# Patient Record
Sex: MALE | Race: WHITE | Employment: OTHER | URBAN - METROPOLITAN AREA
[De-identification: names, ages, dates, MRNs, and addresses within clinical notes are randomized per-mention and may not be internally consistent; named-entity substitution may affect disease eponyms.]

---

## 2018-01-05 PROBLEM — E66.01 OBESITY, MORBID (HCC): Status: ACTIVE | Noted: 2018-01-05

## 2018-01-05 PROBLEM — I10 ESSENTIAL HYPERTENSION: Status: ACTIVE | Noted: 2018-01-05

## 2019-12-04 ENCOUNTER — HOSPITAL ENCOUNTER (EMERGENCY)
Age: 64
Discharge: HOME | End: 2019-12-04
Attending: EMERGENCY MEDICINE
Payer: COMMERCIAL

## 2019-12-04 VITALS
OXYGEN SATURATION: 94 % | DIASTOLIC BLOOD PRESSURE: 63 MMHG | WEIGHT: 315 LBS | SYSTOLIC BLOOD PRESSURE: 132 MMHG | TEMPERATURE: 99 F | HEART RATE: 66 BPM | RESPIRATION RATE: 16 BRPM | BODY MASS INDEX: 41.96 KG/M2

## 2019-12-04 DIAGNOSIS — N30.90 CYSTITIS: Primary | ICD-10-CM

## 2019-12-04 LAB
ALBUMIN SERPL-MCNC: 3.5 G/DL (ref 3.2–4.6)
ALBUMIN/GLOB SERPL: 0.7 {RATIO} (ref 1.2–3.5)
ALP SERPL-CCNC: 69 U/L (ref 50–136)
ALT SERPL-CCNC: 41 U/L (ref 12–65)
AMORPH CRY URNS QL MICRO: ABNORMAL
ANION GAP SERPL CALC-SCNC: 6 MMOL/L (ref 7–16)
AST SERPL-CCNC: 28 U/L (ref 15–37)
BACTERIA URNS QL MICRO: ABNORMAL /HPF
BASOPHILS # BLD: 0 K/UL (ref 0–0.2)
BASOPHILS NFR BLD: 0 % (ref 0–2)
BILIRUB SERPL-MCNC: 0.4 MG/DL (ref 0.2–1.1)
BUN SERPL-MCNC: 15 MG/DL (ref 8–23)
CALCIUM SERPL-MCNC: 8.7 MG/DL (ref 8.3–10.4)
CASTS URNS QL MICRO: 0 /LPF
CHLORIDE SERPL-SCNC: 104 MMOL/L (ref 98–107)
CO2 SERPL-SCNC: 28 MMOL/L (ref 21–32)
CREAT SERPL-MCNC: 1.21 MG/DL (ref 0.8–1.5)
CRYSTALS URNS QL MICRO: 0 /LPF
DIFFERENTIAL METHOD BLD: ABNORMAL
EOSINOPHIL # BLD: 0.2 K/UL (ref 0–0.8)
EOSINOPHIL NFR BLD: 2 % (ref 0.5–7.8)
EPI CELLS #/AREA URNS HPF: 0 /HPF
ERYTHROCYTE [DISTWIDTH] IN BLOOD BY AUTOMATED COUNT: 12.7 % (ref 11.9–14.6)
GLOBULIN SER CALC-MCNC: 4.7 G/DL (ref 2.3–3.5)
GLUCOSE SERPL-MCNC: 113 MG/DL (ref 65–100)
HCT VFR BLD AUTO: 42.9 % (ref 41.1–50.3)
HGB BLD-MCNC: 14.2 G/DL (ref 13.6–17.2)
IMM GRANULOCYTES # BLD AUTO: 0 K/UL (ref 0–0.5)
IMM GRANULOCYTES NFR BLD AUTO: 0 % (ref 0–5)
LYMPHOCYTES # BLD: 3.2 K/UL (ref 0.5–4.6)
LYMPHOCYTES NFR BLD: 31 % (ref 13–44)
MCH RBC QN AUTO: 30 PG (ref 26.1–32.9)
MCHC RBC AUTO-ENTMCNC: 33.1 G/DL (ref 31.4–35)
MCV RBC AUTO: 90.7 FL (ref 79.6–97.8)
MONOCYTES # BLD: 1.1 K/UL (ref 0.1–1.3)
MONOCYTES NFR BLD: 10 % (ref 4–12)
MUCOUS THREADS URNS QL MICRO: 0 /LPF
NEUTS SEG # BLD: 5.7 K/UL (ref 1.7–8.2)
NEUTS SEG NFR BLD: 56 % (ref 43–78)
NRBC # BLD: 0 K/UL (ref 0–0.2)
OTHER OBSERVATIONS,UCOM: ABNORMAL
PLATELET # BLD AUTO: 240 K/UL (ref 150–450)
PMV BLD AUTO: 9.1 FL (ref 9.4–12.3)
POTASSIUM SERPL-SCNC: 4 MMOL/L (ref 3.5–5.1)
PROT SERPL-MCNC: 8.2 G/DL (ref 6.3–8.2)
RBC # BLD AUTO: 4.73 M/UL (ref 4.23–5.6)
RBC #/AREA URNS HPF: >100 /HPF
SODIUM SERPL-SCNC: 138 MMOL/L (ref 136–145)
WBC # BLD AUTO: 10.2 K/UL (ref 4.3–11.1)
WBC URNS QL MICRO: >100 /HPF

## 2019-12-04 PROCEDURE — 96365 THER/PROPH/DIAG IV INF INIT: CPT | Performed by: EMERGENCY MEDICINE

## 2019-12-04 PROCEDURE — 74011250636 HC RX REV CODE- 250/636: Performed by: EMERGENCY MEDICINE

## 2019-12-04 PROCEDURE — 85025 COMPLETE CBC W/AUTO DIFF WBC: CPT

## 2019-12-04 PROCEDURE — 81003 URINALYSIS AUTO W/O SCOPE: CPT | Performed by: EMERGENCY MEDICINE

## 2019-12-04 PROCEDURE — 99284 EMERGENCY DEPT VISIT MOD MDM: CPT | Performed by: EMERGENCY MEDICINE

## 2019-12-04 PROCEDURE — 80053 COMPREHEN METABOLIC PANEL: CPT

## 2019-12-04 PROCEDURE — 81015 MICROSCOPIC EXAM OF URINE: CPT

## 2019-12-04 RX ORDER — CIPROFLOXACIN 500 MG/1
500 TABLET ORAL 2 TIMES DAILY
Qty: 20 TAB | Refills: 0 | Status: SHIPPED | OUTPATIENT
Start: 2019-12-04 | End: 2019-12-14

## 2019-12-04 RX ORDER — CIPROFLOXACIN 500 MG/1
500 TABLET ORAL 2 TIMES DAILY
Qty: 20 TAB | Refills: 0 | Status: SHIPPED | OUTPATIENT
Start: 2019-12-04 | End: 2019-12-04 | Stop reason: SDUPTHER

## 2019-12-04 RX ORDER — CIPROFLOXACIN 2 MG/ML
400 INJECTION, SOLUTION INTRAVENOUS
Status: COMPLETED | OUTPATIENT
Start: 2019-12-04 | End: 2019-12-04

## 2019-12-04 RX ADMIN — CIPROFLOXACIN 400 MG: 2 INJECTION, SOLUTION INTRAVENOUS at 21:38

## 2019-12-04 NOTE — ED TRIAGE NOTES
Pt to ed with c/o urinary problems, pt reports frequency and feeling like he needs to go but unable to get much out - pt reports that he was seen a few weeks ago at [address] with sepsis and uti - pt denies any n/v/d

## 2019-12-05 NOTE — ED PROVIDER NOTES
60-year-old male recently hospitalized for sepsis of urinary origin and rhabdomyolysis presenting for recurrent suprapubic discomfort, burning and hematuria. Reports that 3 weeks ago the patient started having very similar symptoms and was seen in urgent care where he was given an intramuscular shot of ceftriaxone. Was sent home with a prescription for oral antibiotics but that night he became delirious, fell between his bed and the nightstand and is unsure of exactly how long he was there. He was found by his brother and EMS was called. Was eventually seen at [facility] where he was found to have a CK of over 14,000, elevated white count, elevated heart rate and was admitted to the hospital for sepsis of urinary origin. He had a 6-day stay was treated with IV antibiotics. He was discharged home with 3 more days of Keflex and his symptoms have gotten better until today. He is describing burning, hesitancy, frequency, and scant hematuria. He denies fevers, nausea, vomiting. The history is provided by the patient. Urinary Pain    Associated symptoms include frequency, hematuria and urgency. Past Medical History:   Diagnosis Date    DVT of leg (deep venous thrombosis) ([facility])     Epistaxis     Essential hypertension 1/5/2018    GERD (gastroesophageal reflux disease)     Pulmonary embolus ([facility])        No past surgical history on file.       Family History:   Problem Relation Age of Onset    Stroke Mother     Heart Disease Mother     Heart Disease Brother        Social History     Socioeconomic History    Marital status:      Spouse name: Not on file    Number of children: Not on file    Years of education: Not on file    Highest education level: Not on file   Occupational History    Not on file   Social Needs    Financial resource strain: Not on file    Food insecurity:     Worry: Not on file     Inability: Not on file    Transportation needs:     Medical: Not on file Non-medical: Not on file   Tobacco Use    Smoking status: Former Smoker    Smokeless tobacco: Never Used    Tobacco comment: quit 15-20 years ago    Substance and Sexual Activity    Alcohol use: No    Drug use: No    Sexual activity: Not on file   Lifestyle    Physical activity:     Days per week: Not on file     Minutes per session: Not on file    Stress: Not on file   Relationships    Social connections:     Talks on phone: Not on file     Gets together: Not on file     Attends Buddhist service: Not on file     Active member of club or organization: Not on file     Attends meetings of clubs or organizations: Not on file     Relationship status: Not on file    Intimate partner violence:     Fear of current or ex partner: Not on file     Emotionally abused: Not on file     Physically abused: Not on file     Forced sexual activity: Not on file   Other Topics Concern    Not on file   Social History Narrative    Not on file         ALLERGIES: Aspirin and Ibuprofen    Review of Systems   Genitourinary: Positive for difficulty urinating, dysuria, frequency, hematuria and urgency. All other systems reviewed and are negative. Vitals:    12/04/19 1743   BP: 149/84   Pulse: 79   Resp: 20   Temp: 98.8 °F (37.1 °C)   SpO2: 98%   Weight: 144.2 kg (318 lb)            Physical Exam  Vitals signs and nursing note reviewed. Constitutional:       Appearance: Normal appearance. He is well-developed. HENT:      Head: Normocephalic and atraumatic. Nose: Nose normal.   Eyes:      Extraocular Movements: Extraocular movements intact. Conjunctiva/sclera: Conjunctivae normal.      Pupils: Pupils are equal, round, and reactive to light. Neck:      Musculoskeletal: Normal range of motion and neck supple. Cardiovascular:      Rate and Rhythm: Normal rate and regular rhythm. Heart sounds: Normal heart sounds.    Pulmonary:      Effort: Pulmonary effort is normal.      Breath sounds: Normal breath sounds. Abdominal:      General: Bowel sounds are normal.      Palpations: Abdomen is soft. Musculoskeletal: Normal range of motion. General: No deformity. Skin:     General: Skin is warm and dry. Neurological:      Mental Status: He is alert and oriented to person, place, and time. Cranial Nerves: No cranial nerve deficit. Psychiatric:         Behavior: Behavior normal.          MDM  Number of Diagnoses or Management Options  Cystitis:   Diagnosis management comments: 60-year-old male presenting for recurrent urinary symptoms. Appears he did have a complicated course several weeks ago with sepsis of urinary origin currently he appears clinically well. Blood work was ordered prior to my evaluation and is unremarkable. A review of the medical record reveals that during his initial evaluation several weeks ago the patient had an elevated white count of 22,000, a CPK of greater than 14,000, acute renal failure, tachycardia and a prolonged hospitalization. Today he has none of those things. Review of the medical record revealed pan susceptible E. coli some treating the patient with Cipro given that it has good prostate penetration. He has follow-up with urology in 2 days.        Amount and/or Complexity of Data Reviewed  Clinical lab tests: ordered and reviewed (Results for orders placed or performed during the hospital encounter of 12/04/19  -CBC WITH AUTOMATED DIFF       Result                      Value             Ref Range           WBC                         10.2              4.3 - 11.1 K*       RBC                         4.73              4.23 - 5.6 M*       HGB                         14.2              13.6 - 17.2 *       HCT                         42.9              41.1 - 50.3 %       MCV                         90.7              79.6 - 97.8 *       MCH                         30.0              26.1 - 32.9 *       MCHC                        33.1              31.4 - 35.0 * RDW                         12.7              11.9 - 14.6 %       PLATELET                    240               150 - 450 K/*       MPV                         9.1 (L)           9.4 - 12.3 FL       ABSOLUTE NRBC               0.00              0.0 - 0.2 K/*       DF                          AUTOMATED                             NEUTROPHILS                 56                43 - 78 %           LYMPHOCYTES                 31                13 - 44 %           MONOCYTES                   10                4.0 - 12.0 %        EOSINOPHILS                 2                 0.5 - 7.8 %         BASOPHILS                   0                 0.0 - 2.0 %         IMMATURE GRANULOCYTES       0                 0.0 - 5.0 %         ABS. NEUTROPHILS            5.7               1.7 - 8.2 K/*       ABS. LYMPHOCYTES            3.2               0.5 - 4.6 K/*       ABS. MONOCYTES              1.1               0.1 - 1.3 K/*       ABS. EOSINOPHILS            0.2               0.0 - 0.8 K/*       ABS. BASOPHILS              0.0               0.0 - 0.2 K/*       ABS. IMM.  GRANS.            0.0               0.0 - 0.5 K/*  -METABOLIC PANEL, COMPREHENSIVE       Result                      Value             Ref Range           Sodium                      138               136 - 145 mm*       Potassium                   4.0               3.5 - 5.1 mm*       Chloride                    104               98 - 107 mmo*       CO2                         28                21 - 32 mmol*       Anion gap                   6 (L)             7 - 16 mmol/L       Glucose                     113 (H)           65 - 100 mg/*       BUN                         15                8 - 23 MG/DL        Creatinine                  1.21              0.8 - 1.5 MG*       GFR est AA                  >60               >60 ml/min/1*       GFR est non-AA              >60               >60 ml/min/1*       Calcium                     8.7               8.3 - 10.4 M* Bilirubin, total            0.4               0.2 - 1.1 MG*       ALT (SGPT)                  41                12 - 65 U/L         AST (SGOT)                  28                15 - 37 U/L         Alk. phosphatase            69                50 - 136 U/L        Protein, total              8.2               6.3 - 8.2 g/*       Albumin                     3.5               3.2 - 4.6 g/*       Globulin                    4.7 (H)           2.3 - 3.5 g/*       A-G Ratio                   0.7 (L)           1.2 - 3.5      -URINE MICROSCOPIC       Result                      Value             Ref Range           WBC                         >100              0 /hpf              RBC                         >100              0 /hpf              Epithelial cells            0                 0 /hpf              Bacteria                    4+ (H)            0 /hpf              Casts                       0                 0 /lpf              Crystals, urine             0                 0 /LPF              Amorphous Crystals          1+ (H)            0                   Mucus                       0                 0 /lpf              Other observations                                            RESULTS VERIFIED MANUALLY  )  Decide to obtain previous medical records or to obtain history from someone other than the patient: yes  Review and summarize past medical records: yes (Recent hospitalization for sepsis of urinary origin. During that stay the patient had agreed distally abnormal labs including elevated white count, CPK, abnormal vital signs.)    Risk of Complications, Morbidity, and/or Mortality  Presenting problems: high  Diagnostic procedures: high  Management options: moderate  General comments: Appears hemodynamically stable. No signs of sepsis.   Patient does have 4+ bacteria and urinalysis but I think we can treat with IV Cipro here in the emergency department for good prostate penetration and the patient has follow-up with Dr. Daisy Almanza in 2 days. He is been given clear return precautions should his symptoms change    I personally reviewed the patient's vital signs, laboratory tests, and/or radiological findings. I discussed these findings with the patient and their significance. I answered all questions and gave the patient clear return precautions.   The patient was discharged from the emergency department in stable condition        Patient Progress  Patient progress: stable         Procedures

## 2019-12-05 NOTE — DISCHARGE INSTRUCTIONS
You have no signs of sepsis today. It does not mean that it will not change between now and Friday. Start your prescription antibiotics tomorrow morning. Return to the ER should she develop fevers, confusion, inability to urinate.

## 2019-12-05 NOTE — ED NOTES
I have reviewed discharge instructions with the patient. The patient verbalized understanding. Patient left ED via Discharge Method: ambulatory to Home with (insert name of family/friend, self, transport family). Opportunity for questions and clarification provided. Patient given 1 scripts. To continue your aftercare when you leave the hospital, you may receive an automated call from our care team to check in on how you are doing. This is a free service and part of our promise to provide the best care and service to meet your aftercare needs.  If you have questions, or wish to unsubscribe from this service please call [phone]. Thank you for Choosing our [facility] Emergency Department.

## 2019-12-19 PROBLEM — I48.0 PAROXYSMAL ATRIAL FIBRILLATION (HCC): Status: ACTIVE | Noted: 2019-12-19

## 2020-02-11 PROCEDURE — 88112 CYTOPATH CELL ENHANCE TECH: CPT

## 2020-02-12 ENCOUNTER — HOSPITAL ENCOUNTER (OUTPATIENT)
Dept: LAB | Age: 65
Discharge: HOME | End: 2020-02-12

## 2020-02-12 NOTE — PROGRESS NOTES
No cancer cells identified in the urine. Microhematuria is most likely d/t enlarged prostate and being on blood thinners. No intervention is needed unless he begins to have gross hematuria. Needs f/u in 1 year with Dr Rosalind Costa.

## 2021-03-15 ENCOUNTER — HOSPITAL ENCOUNTER (OUTPATIENT)
Dept: LAB | Age: 66
Discharge: HOME | End: 2021-03-15
Payer: MEDICARE

## 2021-03-15 DIAGNOSIS — R06.09 DYSPNEA ON EXERTION: ICD-10-CM

## 2021-03-15 DIAGNOSIS — I48.0 PAROXYSMAL ATRIAL FIBRILLATION (HCC): ICD-10-CM

## 2021-03-15 LAB
BASOPHILS # BLD: 0 K/UL (ref 0–0.2)
BASOPHILS NFR BLD: 0 % (ref 0–2)
BNP SERPL-MCNC: 125 PG/ML (ref 5–125)
DIFFERENTIAL METHOD BLD: ABNORMAL
EOSINOPHIL # BLD: 0.4 K/UL (ref 0–0.8)
EOSINOPHIL NFR BLD: 6 % (ref 0.5–7.8)
ERYTHROCYTE [DISTWIDTH] IN BLOOD BY AUTOMATED COUNT: 12.9 % (ref 11.9–14.6)
HCT VFR BLD AUTO: 44 % (ref 41.1–50.3)
HGB BLD-MCNC: 14.3 G/DL (ref 13.6–17.2)
IMM GRANULOCYTES # BLD AUTO: 0 K/UL (ref 0–0.5)
IMM GRANULOCYTES NFR BLD AUTO: 1 % (ref 0–5)
LYMPHOCYTES # BLD: 3.2 K/UL (ref 0.5–4.6)
LYMPHOCYTES NFR BLD: 45 % (ref 13–44)
MCH RBC QN AUTO: 30.2 PG (ref 26.1–32.9)
MCHC RBC AUTO-ENTMCNC: 32.5 G/DL (ref 31.4–35)
MCV RBC AUTO: 92.8 FL (ref 79.6–97.8)
MONOCYTES # BLD: 0.7 K/UL (ref 0.1–1.3)
MONOCYTES NFR BLD: 10 % (ref 4–12)
NEUTS SEG # BLD: 2.8 K/UL (ref 1.7–8.2)
NEUTS SEG NFR BLD: 38 % (ref 43–78)
NRBC # BLD: 0 K/UL (ref 0–0.2)
PLATELET # BLD AUTO: 238 K/UL (ref 150–450)
PMV BLD AUTO: 9.9 FL (ref 9.4–12.3)
RBC # BLD AUTO: 4.74 M/UL (ref 4.23–5.6)
TSH SERPL DL<=0.005 MIU/L-ACNC: 0.72 UIU/ML (ref 0.36–3.74)
WBC # BLD AUTO: 7.2 K/UL (ref 4.3–11.1)

## 2021-03-15 PROCEDURE — 84443 ASSAY THYROID STIM HORMONE: CPT

## 2021-03-15 PROCEDURE — 83880 ASSAY OF NATRIURETIC PEPTIDE: CPT

## 2021-03-15 PROCEDURE — 36415 COLL VENOUS BLD VENIPUNCTURE: CPT

## 2021-03-15 PROCEDURE — 85025 COMPLETE CBC W/AUTO DIFF WBC: CPT

## 2021-04-01 ENCOUNTER — HOSPITAL ENCOUNTER (OUTPATIENT)
Dept: LAB | Age: 66
Discharge: HOME | End: 2021-04-01
Payer: MEDICARE

## 2021-04-01 DIAGNOSIS — R06.09 DYSPNEA ON EXERTION: ICD-10-CM

## 2021-04-01 LAB
ANION GAP SERPL CALC-SCNC: 3 MMOL/L (ref 7–16)
BASOPHILS # BLD: 0 K/UL (ref 0–0.2)
BASOPHILS NFR BLD: 1 % (ref 0–2)
BUN SERPL-MCNC: 14 MG/DL (ref 8–23)
CALCIUM SERPL-MCNC: 9.2 MG/DL (ref 8.3–10.4)
CHLORIDE SERPL-SCNC: 102 MMOL/L (ref 98–107)
CO2 SERPL-SCNC: 31 MMOL/L (ref 21–32)
CREAT SERPL-MCNC: 1.24 MG/DL (ref 0.8–1.5)
DIFFERENTIAL METHOD BLD: ABNORMAL
EOSINOPHIL # BLD: 0.4 K/UL (ref 0–0.8)
EOSINOPHIL NFR BLD: 6 % (ref 0.5–7.8)
ERYTHROCYTE [DISTWIDTH] IN BLOOD BY AUTOMATED COUNT: 12.8 % (ref 11.9–14.6)
GLUCOSE SERPL-MCNC: 132 MG/DL (ref 65–100)
HCT VFR BLD AUTO: 44.2 % (ref 41.1–50.3)
HGB BLD-MCNC: 14.4 G/DL (ref 13.6–17.2)
IMM GRANULOCYTES # BLD AUTO: 0 K/UL (ref 0–0.5)
IMM GRANULOCYTES NFR BLD AUTO: 0 % (ref 0–5)
INR PPP: 1.2
LYMPHOCYTES # BLD: 2.8 K/UL (ref 0.5–4.6)
LYMPHOCYTES NFR BLD: 40 % (ref 13–44)
MCH RBC QN AUTO: 29.9 PG (ref 26.1–32.9)
MCHC RBC AUTO-ENTMCNC: 32.6 G/DL (ref 31.4–35)
MCV RBC AUTO: 91.9 FL (ref 79.6–97.8)
MONOCYTES # BLD: 0.6 K/UL (ref 0.1–1.3)
MONOCYTES NFR BLD: 9 % (ref 4–12)
NEUTS SEG # BLD: 3.1 K/UL (ref 1.7–8.2)
NEUTS SEG NFR BLD: 44 % (ref 43–78)
NRBC # BLD: 0 K/UL (ref 0–0.2)
PLATELET # BLD AUTO: 242 K/UL (ref 150–450)
PMV BLD AUTO: 9 FL (ref 9.4–12.3)
POTASSIUM SERPL-SCNC: 4.3 MMOL/L (ref 3.5–5.1)
PROTHROMBIN TIME: 15.3 SEC (ref 12.5–14.7)
RBC # BLD AUTO: 4.81 M/UL (ref 4.23–5.6)
SODIUM SERPL-SCNC: 136 MMOL/L (ref 138–145)
WBC # BLD AUTO: 7 K/UL (ref 4.3–11.1)

## 2021-04-01 PROCEDURE — 85610 PROTHROMBIN TIME: CPT

## 2021-04-01 PROCEDURE — 36415 COLL VENOUS BLD VENIPUNCTURE: CPT

## 2021-04-01 PROCEDURE — 85025 COMPLETE CBC W/AUTO DIFF WBC: CPT

## 2021-04-01 PROCEDURE — 80048 BASIC METABOLIC PNL TOTAL CA: CPT

## 2021-04-02 NOTE — PROGRESS NOTES
Patient pre-assessment complete for [facility] scheduled for 930, arrival time 0730. Patient verified using . Patient instructed to bring all home medications in labeled bottles on the day of procedure. NPO status reinforced. Patient informed to take a full dose aspirin 325mg  or 81 mg x 4 on the day of procedure. Patient instructed to HOLD eliquis and metformin. Instructed they can take all other medications excluding vitamins & supplements. Patient verbalizes understanding of all instructions & denies any questions at this time.

## 2021-04-05 ENCOUNTER — HOSPITAL ENCOUNTER (OUTPATIENT)
Dept: CARDIAC CATH/INVASIVE PROCEDURES | Age: 66
Discharge: HOME | End: 2021-04-06
Attending: INTERNAL MEDICINE | Admitting: INTERNAL MEDICINE
Payer: MEDICARE

## 2021-04-05 DIAGNOSIS — Z98.61 POST PTCA: ICD-10-CM

## 2021-04-05 DIAGNOSIS — R06.02 SHORTNESS OF BREATH: ICD-10-CM

## 2021-04-05 DIAGNOSIS — I25.118 CORONARY ARTERY DISEASE INVOLVING NATIVE CORONARY ARTERY OF NATIVE HEART WITH OTHER FORM OF ANGINA PECTORIS (HCC): ICD-10-CM

## 2021-04-05 LAB
ACT BLD: 450 SECS (ref 70–128)
ATRIAL RATE: 55 BPM
ATRIAL RATE: 63 BPM
CALCULATED P AXIS, ECG09: 23 DEGREES
CALCULATED P AXIS, ECG09: 34 DEGREES
CALCULATED R AXIS, ECG10: -69 DEGREES
CALCULATED R AXIS, ECG10: -76 DEGREES
CALCULATED T AXIS, ECG11: 41 DEGREES
CALCULATED T AXIS, ECG11: 42 DEGREES
DIAGNOSIS, 93000: NORMAL
DIAGNOSIS, 93000: NORMAL
GLUCOSE BLD STRIP.AUTO-MCNC: 121 MG/DL (ref 65–100)
GLUCOSE BLD STRIP.AUTO-MCNC: 130 MG/DL (ref 65–100)
P-R INTERVAL, ECG05: 144 MS
P-R INTERVAL, ECG05: 146 MS
Q-T INTERVAL, ECG07: 446 MS
Q-T INTERVAL, ECG07: 448 MS
QRS DURATION, ECG06: 108 MS
QRS DURATION, ECG06: 112 MS
QTC CALCULATION (BEZET), ECG08: 428 MS
QTC CALCULATION (BEZET), ECG08: 456 MS
SERVICE CMNT-IMP: ABNORMAL
SERVICE CMNT-IMP: ABNORMAL
VENTRICULAR RATE, ECG03: 55 BPM
VENTRICULAR RATE, ECG03: 63 BPM

## 2021-04-05 PROCEDURE — 74011250637 HC RX REV CODE- 250/637: Performed by: INTERNAL MEDICINE

## 2021-04-05 PROCEDURE — 99153 MOD SED SAME PHYS/QHP EA: CPT

## 2021-04-05 PROCEDURE — 93571 IV DOP VEL&/PRESS C FLO 1ST: CPT

## 2021-04-05 PROCEDURE — C1753 CATH, INTRAVAS ULTRASOUND: HCPCS

## 2021-04-05 PROCEDURE — 92921 HC PTCA SNGL MAJOR COR VESL/BRNCH EA ADD LD: CPT

## 2021-04-05 PROCEDURE — 77030019569 HC BND COMPR RAD TERU -B

## 2021-04-05 PROCEDURE — 93571 IV DOP VEL&/PRESS C FLO 1ST: CPT | Performed by: INTERNAL MEDICINE

## 2021-04-05 PROCEDURE — 77030020268 HC MISC GENERAL SUPPLY

## 2021-04-05 PROCEDURE — 74011000258 HC RX REV CODE- 258: Performed by: INTERNAL MEDICINE

## 2021-04-05 PROCEDURE — 92978 ENDOLUMINL IVUS OCT C 1ST: CPT | Performed by: INTERNAL MEDICINE

## 2021-04-05 PROCEDURE — 77030016699 HC CATH ANGI DX INFN1 CARD -A

## 2021-04-05 PROCEDURE — C1725 CATH, TRANSLUMIN NON-LASER: HCPCS

## 2021-04-05 PROCEDURE — C1887 CATHETER, GUIDING: HCPCS

## 2021-04-05 PROCEDURE — 74011250636 HC RX REV CODE- 250/636: Performed by: INTERNAL MEDICINE

## 2021-04-05 PROCEDURE — 93572 IV DOP VEL&/PRESS C FLO EA: CPT

## 2021-04-05 PROCEDURE — C1894 INTRO/SHEATH, NON-LASER: HCPCS

## 2021-04-05 PROCEDURE — 82962 GLUCOSE BLOOD TEST: CPT

## 2021-04-05 PROCEDURE — 77030011222 HC DEV INFL PTCA BSC -B

## 2021-04-05 PROCEDURE — 92928 PRQ TCAT PLMT NTRAC ST 1 LES: CPT

## 2021-04-05 PROCEDURE — 74011000636 HC RX REV CODE- 636: Performed by: INTERNAL MEDICINE

## 2021-04-05 PROCEDURE — C1876 STENT, NON-COA/NON-COV W/DEL: HCPCS

## 2021-04-05 PROCEDURE — C1874 STENT, COATED/COV W/DEL SYS: HCPCS

## 2021-04-05 PROCEDURE — C1769 GUIDE WIRE: HCPCS

## 2021-04-05 PROCEDURE — 77030015766

## 2021-04-05 PROCEDURE — 2709999900 HC NON-CHARGEABLE SUPPLY

## 2021-04-05 PROCEDURE — 92978 ENDOLUMINL IVUS OCT C 1ST: CPT

## 2021-04-05 PROCEDURE — 99218 HC RM OBSERVATION: CPT

## 2021-04-05 PROCEDURE — 93458 L HRT ARTERY/VENTRICLE ANGIO: CPT

## 2021-04-05 PROCEDURE — 92928 PRQ TCAT PLMT NTRAC ST 1 LES: CPT | Performed by: INTERNAL MEDICINE

## 2021-04-05 PROCEDURE — 93458 L HRT ARTERY/VENTRICLE ANGIO: CPT | Performed by: INTERNAL MEDICINE

## 2021-04-05 PROCEDURE — 93005 ELECTROCARDIOGRAM TRACING: CPT | Performed by: INTERNAL MEDICINE

## 2021-04-05 PROCEDURE — 99152 MOD SED SAME PHYS/QHP 5/>YRS: CPT

## 2021-04-05 PROCEDURE — 85347 COAGULATION TIME ACTIVATED: CPT

## 2021-04-05 PROCEDURE — 74011000250 HC RX REV CODE- 250: Performed by: INTERNAL MEDICINE

## 2021-04-05 RX ORDER — MORPHINE SULFATE 2 MG/ML
2 INJECTION, SOLUTION INTRAMUSCULAR; INTRAVENOUS
Status: DISCONTINUED | OUTPATIENT
Start: 2021-04-05 | End: 2021-04-06 | Stop reason: HOSPADM

## 2021-04-05 RX ORDER — GUAIFENESIN 100 MG/5ML
81 LIQUID (ML) ORAL ONCE
Status: ACTIVE | OUTPATIENT
Start: 2021-04-05 | End: 2021-04-05

## 2021-04-05 RX ORDER — NITROGLYCERIN 0.4 MG/1
0.4 TABLET SUBLINGUAL
Status: DISCONTINUED | OUTPATIENT
Start: 2021-04-05 | End: 2021-04-06 | Stop reason: HOSPADM

## 2021-04-05 RX ORDER — CLOPIDOGREL BISULFATE 75 MG/1
75 TABLET ORAL DAILY
Status: DISCONTINUED | OUTPATIENT
Start: 2021-04-06 | End: 2021-04-06 | Stop reason: HOSPADM

## 2021-04-05 RX ORDER — SODIUM CHLORIDE 0.9 % (FLUSH) 0.9 %
5-40 SYRINGE (ML) INJECTION EVERY 8 HOURS
Status: DISCONTINUED | OUTPATIENT
Start: 2021-04-05 | End: 2021-04-06 | Stop reason: HOSPADM

## 2021-04-05 RX ORDER — ATORVASTATIN CALCIUM 40 MG/1
40 TABLET, FILM COATED ORAL DAILY
Status: DISCONTINUED | OUTPATIENT
Start: 2021-04-06 | End: 2021-04-06 | Stop reason: HOSPADM

## 2021-04-05 RX ORDER — HEPARIN SODIUM 200 [USP'U]/100ML
3 INJECTION, SOLUTION INTRAVENOUS CONTINUOUS
Status: DISCONTINUED | OUTPATIENT
Start: 2021-04-05 | End: 2021-04-05 | Stop reason: HOSPADM

## 2021-04-05 RX ORDER — MIDAZOLAM HYDROCHLORIDE 1 MG/ML
.5-2 INJECTION, SOLUTION INTRAMUSCULAR; INTRAVENOUS
Status: DISCONTINUED | OUTPATIENT
Start: 2021-04-05 | End: 2021-04-05 | Stop reason: HOSPADM

## 2021-04-05 RX ORDER — SODIUM CHLORIDE 9 MG/ML
75 INJECTION, SOLUTION INTRAVENOUS CONTINUOUS
Status: DISCONTINUED | OUTPATIENT
Start: 2021-04-05 | End: 2021-04-05

## 2021-04-05 RX ORDER — SODIUM CHLORIDE 0.9 % (FLUSH) 0.9 %
5-40 SYRINGE (ML) INJECTION AS NEEDED
Status: DISCONTINUED | OUTPATIENT
Start: 2021-04-05 | End: 2021-04-06 | Stop reason: HOSPADM

## 2021-04-05 RX ORDER — FAMOTIDINE 40 MG/5ML
20 POWDER, FOR SUSPENSION ORAL 2 TIMES DAILY
Status: DISCONTINUED | OUTPATIENT
Start: 2021-04-05 | End: 2021-04-06 | Stop reason: HOSPADM

## 2021-04-05 RX ORDER — CLOPIDOGREL BISULFATE 75 MG/1
600 TABLET ORAL ONCE
Status: COMPLETED | OUTPATIENT
Start: 2021-04-05 | End: 2021-04-05

## 2021-04-05 RX ORDER — MAG HYDROX/ALUMINUM HYD/SIMETH 200-200-20
30 SUSPENSION, ORAL (FINAL DOSE FORM) ORAL
Status: COMPLETED | OUTPATIENT
Start: 2021-04-05 | End: 2021-04-05

## 2021-04-05 RX ORDER — LIDOCAINE HYDROCHLORIDE 10 MG/ML
1-30 INJECTION, SOLUTION EPIDURAL; INFILTRATION; INTRACAUDAL; PERINEURAL ONCE
Status: COMPLETED | OUTPATIENT
Start: 2021-04-05 | End: 2021-04-05

## 2021-04-05 RX ORDER — METOPROLOL TARTRATE 50 MG/1
100 TABLET ORAL 2 TIMES DAILY
Status: DISCONTINUED | OUTPATIENT
Start: 2021-04-05 | End: 2021-04-06 | Stop reason: HOSPADM

## 2021-04-05 RX ORDER — VERAPAMIL HYDROCHLORIDE 180 MG/1
180 TABLET, EXTENDED RELEASE ORAL
Status: DISCONTINUED | OUTPATIENT
Start: 2021-04-05 | End: 2021-04-06 | Stop reason: HOSPADM

## 2021-04-05 RX ORDER — FENTANYL CITRATE 50 UG/ML
25-75 INJECTION, SOLUTION INTRAMUSCULAR; INTRAVENOUS
Status: DISCONTINUED | OUTPATIENT
Start: 2021-04-05 | End: 2021-04-05 | Stop reason: HOSPADM

## 2021-04-05 RX ORDER — ACETAMINOPHEN 325 MG/1
650 TABLET ORAL
Status: DISCONTINUED | OUTPATIENT
Start: 2021-04-05 | End: 2021-04-06 | Stop reason: HOSPADM

## 2021-04-05 RX ORDER — GUAIFENESIN 100 MG/5ML
81 LIQUID (ML) ORAL DAILY
Status: DISCONTINUED | OUTPATIENT
Start: 2021-04-06 | End: 2021-04-06 | Stop reason: HOSPADM

## 2021-04-05 RX ADMIN — FAMOTIDINE 20 MG: 40 POWDER, FOR SUSPENSION ORAL at 23:48

## 2021-04-05 RX ADMIN — FENTANYL CITRATE 25 MCG: 50 INJECTION, SOLUTION INTRAMUSCULAR; INTRAVENOUS at 13:28

## 2021-04-05 RX ADMIN — Medication 10 ML: at 21:24

## 2021-04-05 RX ADMIN — HEPARIN SODIUM 3 UNITS/HR: 200 INJECTION, SOLUTION INTRAVENOUS at 12:50

## 2021-04-05 RX ADMIN — CLOPIDOGREL BISULFATE 600 MG: 75 TABLET ORAL at 14:08

## 2021-04-05 RX ADMIN — HEPARIN SODIUM 2 ML: 10000 INJECTION INTRAVENOUS; SUBCUTANEOUS at 13:08

## 2021-04-05 RX ADMIN — METOPROLOL TARTRATE 50 MG: 50 TABLET, FILM COATED ORAL at 23:52

## 2021-04-05 RX ADMIN — NITROGLYCERIN 0.15 MG: 20 INJECTION INTRAVENOUS at 13:55

## 2021-04-05 RX ADMIN — FENTANYL CITRATE 25 MCG: 50 INJECTION, SOLUTION INTRAMUSCULAR; INTRAVENOUS at 13:02

## 2021-04-05 RX ADMIN — ACETAMINOPHEN 650 MG: 325 TABLET ORAL at 21:24

## 2021-04-05 RX ADMIN — BIVALIRUDIN 1.75 MG/KG/HR: 250 INJECTION, POWDER, LYOPHILIZED, FOR SOLUTION INTRAVENOUS at 13:18

## 2021-04-05 RX ADMIN — IOPAMIDOL 285 ML: 755 INJECTION, SOLUTION INTRAVENOUS at 14:02

## 2021-04-05 RX ADMIN — MIDAZOLAM 1 MG: 1 INJECTION INTRAMUSCULAR; INTRAVENOUS at 13:08

## 2021-04-05 RX ADMIN — MIDAZOLAM 2 MG: 1 INJECTION INTRAMUSCULAR; INTRAVENOUS at 13:44

## 2021-04-05 RX ADMIN — MIDAZOLAM 2 MG: 1 INJECTION INTRAMUSCULAR; INTRAVENOUS at 13:02

## 2021-04-05 RX ADMIN — VERAPAMIL HYDROCHLORIDE 180 MG: 180 TABLET, FILM COATED, EXTENDED RELEASE ORAL at 23:49

## 2021-04-05 RX ADMIN — LIDOCAINE HYDROCHLORIDE 4 ML: 10 INJECTION, SOLUTION EPIDURAL; INFILTRATION; INTRACAUDAL; PERINEURAL at 13:04

## 2021-04-05 RX ADMIN — FENTANYL CITRATE 25 MCG: 50 INJECTION, SOLUTION INTRAMUSCULAR; INTRAVENOUS at 13:20

## 2021-04-05 RX ADMIN — FENTANYL CITRATE 25 MCG: 50 INJECTION, SOLUTION INTRAMUSCULAR; INTRAVENOUS at 13:08

## 2021-04-05 RX ADMIN — BIVALIRUDIN 1.75 MG/KG/HR: 250 INJECTION, POWDER, LYOPHILIZED, FOR SOLUTION INTRAVENOUS at 13:33

## 2021-04-05 RX ADMIN — MIDAZOLAM 1 MG: 1 INJECTION INTRAMUSCULAR; INTRAVENOUS at 13:20

## 2021-04-05 RX ADMIN — SODIUM CHLORIDE 75 ML/HR: 900 INJECTION, SOLUTION INTRAVENOUS at 08:03

## 2021-04-05 RX ADMIN — ALUMINUM HYDROXIDE, MAGNESIUM HYDROXIDE, AND SIMETHICONE 30 ML: 200; 200; 20 SUSPENSION ORAL at 14:08

## 2021-04-05 RX ADMIN — METOPROLOL TARTRATE 50 MG: 50 TABLET, FILM COATED ORAL at 23:09

## 2021-04-05 NOTE — PROCEDURES
Brief Cardiac Procedure Note    Patient: Dorie Gao MRN: [id number]  SSN: [id number]    YOB: 1955  Age: 72 y.o. Sex: male      Date of Procedure: 4/5/2021     Pre-procedure Diagnosis: Shortness of Breath    Post-procedure Diagnosis: Coronary Artery Disease    Procedure: Left Heart Catheterization with Percutaneous Coronary Intervention    Brief Description of Procedure: As above    Performed By: Yogi Lundberg MD     Assistants: None    Anesthesia: Moderate Sedation    Estimated Blood Loss: Less than 10 mL      Specimens: None    Implants: None    Findings: 70% prox and mid LAD. DFR 0.85.  80% ostial D1. PCI with synergy JOI. Complications: None    Recommendations: Continue medical therapy.     Signed By: Yogi Lundberg MD     April 5, 2021

## 2021-04-05 NOTE — PROGRESS NOTES
TRANSFER - OUT REPORT:    Verbal report given to Bong Miranda RN on Aba Galvan  being transferred to [address] [place] [address] for routine progression of care       Report consisted of patients Situation, Background, Assessment and   Recommendations(SBAR). Information from the following report(s) SBAR, Procedure Summary, MAR and Recent Results was reviewed with the receiving nurse. Lines:   Peripheral IV 04/05/21 Right Antecubital (Active)        Opportunity for questions and clarification was provided. Patient transported with:   Tech      R band right radial without bleeding or hematoma. Patient is alert and oriented. VSS.

## 2021-04-05 NOTE — PROCEDURES
[facility]  CARDIAC CATH    Name:  Catalino Palomo  MR#:  [id number]  :  1955  ACCOUNT #:  [de-identified]  DATE OF SERVICE:  2021    PROCEDURES PERFORMED:  1. Left heart catheterization, selective coronary arteriography, and left ventriculogram via the right radial approach. 2.  DFR of LAD. 3.  PCI and stenting of the LAD. 4.  Balloon angioplasty of first diagonal artery. 5.  Intravascular ultrasound post stenting of the LAD. PREOPERATIVE DIAGNOSES:  Exertional dyspnea in a patient with multiple cardiovascular risk factors and strong family history of coronary artery disease concerning for anginal equivalent. Symptoms consistent with [facility] cardiovascular class III anginal symptoms despite therapy with metoprolol and verapamil therapy for antianginal relief. POSTOPERATIVE DIAGNOSES:  Obstructive coronary artery disease requiring percutaneous coronary intervention and stenting. SURGEON:  Turner Chicas MD    ASSISTANT:  None. ESTIMATED BLOOD LOSS:  Less than 5 mL. SPECIMENS REMOVED:  None. COMPLICATIONS:  None. IMPLANTS:  Synergy drug-eluting stent. ANESTHESIA:  The patient received a total of 6 mg Versed, 100 mcg fentanyl. Sedation start time was 1300 with procedure completion time of 1400. Sedation was administered by Karo Martin RN, under my supervision. The patient was monitored continuously in regards to level of consciousness, hemodynamic status, and respiratory status. He remained stable throughout the procedure. FINDINGS:  As below. TECHNIQUE:  After informed consent was obtained, the patient was brought to the cardiac catheterization lab. The right radial artery was prepped and draped in the usual sterile fashion. Utilizing modified Seldinger technique and micropuncture needle, the right radial artery was entered. A 6-[place] Terumo Slender sheath was placed without difficulty.   A radial cocktail consisting of 2 mg of verapamil and 2000 units of heparin was administered. A 5-[place] Tiger 4.0 catheter was used to select and engage the ostium of the left main coronary artery and right coronary artery, respectively. Selective injection verification was performed. A pigtail catheter was used to cross the aortic valve and the left ventricle. Hemodynamic measurements and left ventriculogram were obtained. Left ventricular aortic pressure gradient was obtained by pullback technique. At the conclusion of the procedure, the patient was referred for DFR and later PCI of the LAD. Please see procedure note for details. CONTRAST:  285 for diagnostic and interventional procedure    HEMODYNAMICS:  1. Aortic pressure of 109/70.  2.  Left ventricular end-diastolic pressure 15.  3.  There is no significant gradient across the aortic valve. ANGIOGRAPHIC RESULTS:  1. Left main coronary artery:  Large-caliber vessel. Angiographically normal.  2.  LAD:  Large-caliber vessel, 70% proximal stenosis and 70% mid stenosis after the origin of the first diagonal artery. The distal LAD is small caliber and diffusely diseased with 40% stenosis. 3.  First diagonal artery:  Small-caliber vessel. Contains an 80% ostial stenosis. 4.  Left circumflex:  Medium-caliber vessel, 20% proximal stenosis. 5.  First obtuse marginal artery:  Small-caliber vessel. Patent. 6.  Right coronary artery:  Medium caliber vessel, 20% proximal and 20% distal stenosis. 7.  Right PDA:  Small-caliber vessel. Patent. 8.  Right posterolateral branch:  Medium-caliber vessel. Patent. 9.  Left ventriculogram performed in GATICA projection shows normal left ventricular systolic function. EF 60% to 65%. No focal segmental wall motion abnormalities. CONCLUSIONS:  1.  Obstructive one-vessel coronary artery disease involving the LAD. 2.  Normal left ventricular systolic function. 3.  Mild disease in the left circumflex and RCA.     PLAN:  Proceed with hemodynamic assessment of LAD stenosis. PERCUTANEOUS CORONARY INTERVENTION NOTE:  Procedure #1:  DFR of LAD. Technical Factors: The patient was given intravenous Angiomax. XB3.0 guide was used to select and engage the ostium of the left main coronary artery and a [employer] Comet wire was positioned at the ostium of the left main coronary artery and appropriately equalized. At this point, he was given 200 mcg of intracoronary nitroglycerin and the wire was placed in the distal LAD. DFR was measured at 0.85-0.89 on several occasions. At this point, it was felt intervention to the LAD was required. Given the disease within the ostium of the first diagonal artery, a Prowater wire was placed in the first diagonal artery. A 2.25 x 20-mm Emerge balloon was positioned in the LAD and used for predilatation. Procedure #2:   Lesion #1:   PCI of the LAD, pre-stenosis 70% proximal mid artery, post stenosis 0%. Lesion #2:  Ostial diagonal, pre-stenosis 80%, post stenosis 30%. Technical Factors:  XB3.0 guide was utilized. The Comet wire was in the LAD and a Prowater wire was placed in the diagonal.  LAD was predilated with a 2.25 x 20-mm Emerge balloon to 16 atmospheres. Following this, a Synergy 2.75 x 38-mm drug-eluting sent was positioned and deployed to 18 atmospheres. At this point, the Prowater wire was removed and a Whisper wire was placed in the diagonal.  A 2.75 x 50-mm NC Quantum Toney was used to post dilate the LAD stent to 18 atmospheres. A 2.25 x 15-mm Emerge balloon was placed in the diagonal and kissing balloon inflations were performed with 10 mmHg on both balloons, one in the LAD and one in the diagonal.    Following this, there was excellent angiographic result within the diagonal.  Intravascular ultrasound of the LAD was performed that showed the proximal LAD still had a 70% stenosis just prior to the implanted stent.   At this point, a 3.5 x 12-mm Synergy drug-eluting sent was positioned in the proximal LAD and deployed to 18 atmospheres. This was then post dilated at the proximal LAD up into the diagonal to 18 atmospheres. At this point, repeat IVUS was performed that showed good stent apposition throughout the stented course. There was no dissection pre or post stent. The wires were removed and final orthogonal projections were obtained. CONCLUSION:  1. Successful DFR assessment of the LAD with a DFR of 0.85-0.89 indicating hemodynamically significant LAD stenosis. 2.  PCI and stenting of the proximal mid LAD with overlapping 3.5 x 12 and 2.75 x 38-mm drug-eluting stents. This required adjunctive balloon angioplasty of the first diagonal artery with a 2.25-mm balloon. 3.  Successful intravascular ultrasound of the LAD pre and post stenting.       Kim Shearer MD      [id number]  D:  04/05/2021 14:30  T:  04/05/2021 15:12  JOB #:  [id number]  CC:  Bobbi Dennis MD

## 2021-04-05 NOTE — ROUTINE PROCESS
TRANSFER - OUT REPORT: 
 
[facility] with iFR and IVUS Dr. Margaret Mariano RRA access Versed 6mg, fentanyl 100mcg, heparin 2000 units in radial cocktail, plavix 600mg, mylanta 30ml 
angiomax for anticoagulation, bag to finish infusing iFR and IVUS of LAD 
2 stents in LAD 
R band placed right wrist @ 1405 with 12ml air Verbal report given to Juliette LEMONS(name) on [facility]  being transferred to cpru(unit) for routine progression of care Report consisted of patients Situation, Background, Assessment and  
Recommendations(SBAR). Information from the following report(s) OR Summary was reviewed with the receiving nurse. Lines:  
Peripheral IV 04/05/21 Right Antecubital (Active) Opportunity for questions and clarification was provided. Patient transported with: 
 [employer]

## 2021-04-05 NOTE — PROGRESS NOTES
TRANSFER - IN REPORT:    Verbal report received from Henok Gentile, [facility] on [address]  being received from  for routine progression of care      Report consisted of patients Situation, Background, Assessment and   Recommendations(SBAR). Information from the following report(s) SBAR, Procedure Summary, MAR and Recent Results was reviewed with the receiving nurse. Opportunity for questions and clarification was provided. Assessment completed upon patients arrival to unit and care assumed.

## 2021-04-05 NOTE — PROGRESS NOTES
TRANSFER - IN REPORT:    Verbal report received from [address] RN(name) on Wilhemena Bounds  being received from Cath lab (unit) for routine progression of care      Report consisted of patients Situation, Background, Assessment and   Recommendations(SBAR). Information from the following report(s) SBAR was reviewed with the receiving nurse. Opportunity for questions and clarification was provided. Assessment completed upon patients arrival to unit and care assumed. Dual skin assessment completed with RN no skin breakdown noted.  TR band intact to R wrist.

## 2021-04-06 VITALS
DIASTOLIC BLOOD PRESSURE: 63 MMHG | BODY MASS INDEX: 41.75 KG/M2 | HEART RATE: 57 BPM | TEMPERATURE: 98 F | RESPIRATION RATE: 18 BRPM | OXYGEN SATURATION: 97 % | HEIGHT: 73 IN | SYSTOLIC BLOOD PRESSURE: 123 MMHG | WEIGHT: 315 LBS

## 2021-04-06 LAB
ANION GAP SERPL CALC-SCNC: 6 MMOL/L (ref 7–16)
BASOPHILS # BLD: 0 K/UL (ref 0–0.2)
BASOPHILS NFR BLD: 0 % (ref 0–2)
BUN SERPL-MCNC: 14 MG/DL (ref 8–23)
CALCIUM SERPL-MCNC: 8.9 MG/DL (ref 8.3–10.4)
CHLORIDE SERPL-SCNC: 105 MMOL/L (ref 98–107)
CHOLEST SERPL-MCNC: 84 MG/DL
CO2 SERPL-SCNC: 26 MMOL/L (ref 21–32)
CREAT SERPL-MCNC: 0.89 MG/DL (ref 0.8–1.5)
DIFFERENTIAL METHOD BLD: ABNORMAL
EOSINOPHIL # BLD: 0.5 K/UL (ref 0–0.8)
EOSINOPHIL NFR BLD: 7 % (ref 0.5–7.8)
ERYTHROCYTE [DISTWIDTH] IN BLOOD BY AUTOMATED COUNT: 12.6 % (ref 11.9–14.6)
GLUCOSE SERPL-MCNC: 99 MG/DL (ref 65–100)
HCT VFR BLD AUTO: 43.5 % (ref 41.1–50.3)
HDLC SERPL-MCNC: 29 MG/DL (ref 40–60)
HDLC SERPL: 2.9 {RATIO}
HGB BLD-MCNC: 14.3 G/DL (ref 13.6–17.2)
IMM GRANULOCYTES # BLD AUTO: 0 K/UL (ref 0–0.5)
IMM GRANULOCYTES NFR BLD AUTO: 0 % (ref 0–5)
LDLC SERPL CALC-MCNC: 17.6 MG/DL
LIPID PROFILE,FLP: ABNORMAL
LYMPHOCYTES # BLD: 2.9 K/UL (ref 0.5–4.6)
LYMPHOCYTES NFR BLD: 40 % (ref 13–44)
MAGNESIUM SERPL-MCNC: 2.3 MG/DL (ref 1.8–2.4)
MCH RBC QN AUTO: 29.6 PG (ref 26.1–32.9)
MCHC RBC AUTO-ENTMCNC: 32.9 G/DL (ref 31.4–35)
MCV RBC AUTO: 90.1 FL (ref 79.6–97.8)
MONOCYTES # BLD: 0.7 K/UL (ref 0.1–1.3)
MONOCYTES NFR BLD: 9 % (ref 4–12)
NEUTS SEG # BLD: 3 K/UL (ref 1.7–8.2)
NEUTS SEG NFR BLD: 42 % (ref 43–78)
NRBC # BLD: 0 K/UL (ref 0–0.2)
PLATELET # BLD AUTO: 229 K/UL (ref 150–450)
PMV BLD AUTO: 9.2 FL (ref 9.4–12.3)
POTASSIUM SERPL-SCNC: 4 MMOL/L (ref 3.5–5.1)
RBC # BLD AUTO: 4.83 M/UL (ref 4.23–5.6)
SODIUM SERPL-SCNC: 137 MMOL/L (ref 136–145)
TRIGL SERPL-MCNC: 187 MG/DL (ref 35–150)
VLDLC SERPL CALC-MCNC: 37.4 MG/DL (ref 6–23)
WBC # BLD AUTO: 7.1 K/UL (ref 4.3–11.1)

## 2021-04-06 PROCEDURE — 80061 LIPID PANEL: CPT

## 2021-04-06 PROCEDURE — 74011250637 HC RX REV CODE- 250/637: Performed by: INTERNAL MEDICINE

## 2021-04-06 PROCEDURE — 99218 HC RM OBSERVATION: CPT

## 2021-04-06 PROCEDURE — 85025 COMPLETE CBC W/AUTO DIFF WBC: CPT

## 2021-04-06 PROCEDURE — 99217 PR OBSERVATION CARE DISCHARGE MANAGEMENT: CPT | Performed by: INTERNAL MEDICINE

## 2021-04-06 PROCEDURE — 80048 BASIC METABOLIC PNL TOTAL CA: CPT

## 2021-04-06 PROCEDURE — 36415 COLL VENOUS BLD VENIPUNCTURE: CPT

## 2021-04-06 PROCEDURE — 83735 ASSAY OF MAGNESIUM: CPT

## 2021-04-06 RX ORDER — CLOPIDOGREL BISULFATE 75 MG/1
75 TABLET ORAL DAILY
Qty: 30 TAB | Refills: 11 | Status: SHIPPED | OUTPATIENT
Start: 2021-04-06 | End: 2021-10-21

## 2021-04-06 RX ADMIN — METOPROLOL TARTRATE 50 MG: 50 TABLET, FILM COATED ORAL at 09:10

## 2021-04-06 RX ADMIN — Medication 10 ML: at 06:11

## 2021-04-06 RX ADMIN — APIXABAN 5 MG: 5 TABLET, FILM COATED ORAL at 09:06

## 2021-04-06 RX ADMIN — CLOPIDOGREL BISULFATE 75 MG: 75 TABLET ORAL at 09:06

## 2021-04-06 NOTE — DISCHARGE SUMMARY
[facility] Cardiology Discharge Summary     Patient ID:  Kinza Medina  [id number]  53 y.o.  1955    Admit date: 4/5/2021    Discharge date:  04/06/21     Admitting Physician: Adriane Sims MD     Discharge Physician: Thanh Hough NP/Dr. Mason    Admission Diagnoses: HANSEN (dyspnea on exertion) [R06.00]  Post PTCA [Z98.61]    Discharge Diagnoses:   Patient Active Problem List    Diagnosis Date Noted    Post PTCA 04/05/2021     Priority: 1 - One    Paroxysmal atrial fibrillation ([facility] 12/19/2019    Obesity, morbid ([facility] 01/05/2018    Essential hypertension 01/05/2018    Obesity 09/12/2016    Pulmonary embolus ([facility] 10/08/2010    [facility] thrombosis[facility] 10/06/2010    GERD (gastroesophageal reflux disease) 10/06/2010       Cardiology Procedures this admission:  Left heart catheterization with PCI  Consults: None    Hospital Course: Patient was seen at the office of [facility] Cardiology by Dr. Zac Portillo for complaints of HANSEN and chest pain. He was subsequently scheduled for a LHC at [facility] on 4/6/2021. Patient underwent cardiac catheterization by Dr. Zac Portillo. Patient underwent successful DFR assessment of the LAD with a DFR of 0.85-0.89 indicating hemodynamically significant LAD stenosis. Patient had stenting of the proximal mid LAD with overlapping 3.5 x 12 and 2.75 x 38-mm drug-eluting stents. This required adjunctive balloon angioplasty of the first diagonal artery with a 2.25-mm balloon. The LAD had 0% residual stenosis and ostial diagonal had 30 % residual stenosis. Patient tolerated the procedure well and was taken to the telemetry floor for recovery. The following morning patient was up feeling well without any complaints of chest pain or shortness of breath. Patient's right radial cath site was clean, dry and intact without hematoma or bruit. Patient's labs were WNL.  Patient was seen and examined by Dr. Zac Portillo and determined stable and ready for discharge. Patient was instructed on the importance of medication compliance including taking plavix and eliquis (has PAF) but no ASA d/t higher risk of bleeding with triple therapy. For maximized medical therapy for CAD, patient will continue BB and statin. The patient will follow up with [address] [place] [address] Cardiology Dr. Richard Mora on 4/22/2021 at 11:15 am in [place] office. The patient has been referred to cardiac rehab. DISPOSITION: The patient is being discharged home in stable condition on a low saturated fat, low cholesterol and low salt diet. The patient is instructed to advance activities as tolerated to the limit of fatigue or shortness of breath. The patient is instructed to avoid all heavy lifting for 5 days. The patient is instructed to watch the cath site for bleeding/oozing; if seen, the patient is instructed to apply firm pressure with a clean cloth and call [address] [place] [address] Cardiology at [phone]. The patient is instructed to watch for signs of infection which include: increasing area of redness, fever/hot to touch or purulent drainage at the catheterization site. The patient is instructed not to soak in a bathtub for 7-10 days, but is cleared to shower. The patient is instructed to call the office or return to the ER for immediate evaluation for any shortness of breath or chest pain not relieved by NTG. Discharge Exam:   Visit Vitals  BP (!) 113/55 (BP 1 Location: Left upper arm, BP Patient Position: At rest)   Pulse (!) 59   Temp 97.1 °F (36.2 °C)   Resp 17   Ht 6' 1\" (1.854 m)   Wt 144 kg (317 lb 8 oz)   SpO2 94%   BMI 41.89 kg/m²     Patient has been seen by Dr. Richard Mora: see his progress note for exam details.     Recent Results (from the past 24 hour(s))   GLUCOSE, POC    Collection Time: 04/05/21  8:11 AM   Result Value Ref Range    Glucose (POC) 121 (H) 65 - 100 mg/dL    Performed by Sudhakar    EKG, 12 LEAD, INITIAL    Collection Time: 04/05/21  8:35 AM   Result Value Ref Range    Ventricular Rate 55 BPM    Atrial Rate 55 BPM    P-R Interval 144 ms    QRS Duration 112 ms    Q-T Interval 448 ms    QTC Calculation (Bezet) 428 ms    Calculated P Axis 23 degrees    Calculated R Axis -69 degrees    Calculated T Axis 41 degrees    Diagnosis       Sinus bradycardia  Left anterior fascicular block  Nonspecific ST abnormality  Abnormal ECG  When compared with ECG of 07-OCT-2010 06:36,  Vent.  rate has decreased BY  28 BPM  QRS duration has increased  Confirmed by Syd Edmond MD ()SAVANNA ([id number]) on 4/5/2021 7:54:11 PM     POC ACTIVATED CLOTTING TIME    Collection Time: 04/05/21  1:32 PM   Result Value Ref Range    Activated Clotting Time (POC) 450 (H) 70 - 128 SECS   EKG, 12 LEAD, INITIAL    Collection Time: 04/05/21  3:25 PM   Result Value Ref Range    Ventricular Rate 63 BPM    Atrial Rate 63 BPM    P-R Interval 146 ms    QRS Duration 108 ms    Q-T Interval 446 ms    QTC Calculation (Bezet) 456 ms    Calculated P Axis 34 degrees    Calculated R Axis -76 degrees    Calculated T Axis 42 degrees    Diagnosis       Sinus rhythm with occasional Premature ventricular complexes  Left anterior fascicular block  Abnormal ECG  When compared with ECG of 05-APR-2021 08:35,  Premature ventricular complexes are now Present  Confirmed by Syd Edmond MD ()SAVANNA ([id number]) on 4/5/2021 8:15:24 PM     GLUCOSE, POC    Collection Time: 04/05/21  4:14 PM   Result Value Ref Range    Glucose (POC) 130 (H) 65 - 100 mg/dL    Performed by Kendra Callawaycher    METABOLIC PANEL, BASIC    Collection Time: 04/06/21  3:19 AM   Result Value Ref Range    Sodium 137 136 - 145 mmol/L    Potassium 4.0 3.5 - 5.1 mmol/L    Chloride 105 98 - 107 mmol/L    CO2 26 21 - 32 mmol/L    Anion gap 6 (L) 7 - 16 mmol/L    Glucose 99 65 - 100 mg/dL    BUN 14 8 - 23 MG/DL    Creatinine 0.89 0.8 - 1.5 MG/DL    GFR est AA >60 >60 ml/min/1.73m2    GFR est non-AA >60 >60 ml/min/1.73m2    Calcium 8.9 8.3 - 10.4 MG/DL   LIPID PANEL    Collection Time: 04/06/21  3:19 AM   Result Value Ref Range    LIPID PROFILE          Cholesterol, total 84 <200 MG/DL    Triglyceride 187 (H) 35 - 150 MG/DL    HDL Cholesterol 29 (L) 40 - 60 MG/DL    LDL, calculated 17.6 <100 MG/DL    VLDL, calculated 37.4 (H) 6.0 - 23.0 MG/DL    CHOL/HDL Ratio 2.9     MAGNESIUM    Collection Time: 04/06/21  3:19 AM   Result Value Ref Range    Magnesium 2.3 1.8 - 2.4 mg/dL   CBC WITH AUTOMATED DIFF    Collection Time: 04/06/21  3:19 AM   Result Value Ref Range    WBC 7.1 4.3 - 11.1 K/uL    RBC 4.83 4.23 - 5.6 M/uL    HGB 14.3 13.6 - 17.2 g/dL    HCT 43.5 41.1 - 50.3 %    MCV 90.1 79.6 - 97.8 FL    MCH 29.6 26.1 - 32.9 PG    MCHC 32.9 31.4 - 35.0 g/dL    RDW 12.6 11.9 - 14.6 %    PLATELET 233 143 - 588 K/uL    MPV 9.2 (L) 9.4 - 12.3 FL    ABSOLUTE NRBC 0.00 0.0 - 0.2 K/uL    DF AUTOMATED      NEUTROPHILS 42 (L) 43 - 78 %    LYMPHOCYTES 40 13 - 44 %    MONOCYTES 9 4.0 - 12.0 %    EOSINOPHILS 7 0.5 - 7.8 %    BASOPHILS 0 0.0 - 2.0 %    IMMATURE GRANULOCYTES 0 0.0 - 5.0 %    ABS. NEUTROPHILS 3.0 1.7 - 8.2 K/UL    ABS. LYMPHOCYTES 2.9 0.5 - 4.6 K/UL    ABS. MONOCYTES 0.7 0.1 - 1.3 K/UL    ABS. EOSINOPHILS 0.5 0.0 - 0.8 K/UL    ABS. BASOPHILS 0.0 0.0 - 0.2 K/UL    ABS. IMM. GRANS. 0.0 0.0 - 0.5 K/UL         Patient Instructions:     Current Discharge Medication List      START taking these medications    Details   clopidogreL (PLAVIX) 75 mg tab Take 1 Tab by mouth daily. Qty: 30 Tab, Refills: 11         CONTINUE these medications which have NOT CHANGED    Details   apixaban (Eliquis) 5 mg tablet Take 1 Tab by mouth two (2) times a day. Qty: 180 Tab, Refills: 3    Associated Diagnoses: Paroxysmal atrial fibrillation (HCC)      metoprolol tartrate (Lopressor) 100 mg IR tablet Take 1 Tab by mouth two (2) times a day. Qty: 180 Tab, Refills: 3    Associated Diagnoses: Paroxysmal atrial fibrillation ([address] Dyspnea on exertion      atorvastatin (LIPITOR) 40 mg tablet Take 40 mg by mouth daily.       metFORMIN (GLUCOPHAGE) 850 mg tablet Take 850 mg by mouth two (2) times a day. omeprazole (PRILOSEC) 40 mg capsule Take 40 mg by mouth daily. verapamil ER (CALAN-SR) 180 mg CR tablet Take 1 Tab by mouth nightly.   Qty: 90 Tab, Refills: 3    Associated Diagnoses: Essential hypertension               Signed:  GREGORY Lr  4/6/2021  7:38 AM

## 2021-04-06 NOTE — PROGRESS NOTES
Discharge instructions were reviewed with patient. An opportunity was given for questions. All medications were reviewed, and information was given on the new medications. Patient verbalized understanding, and has no questions at this time. Cath site  Clean, dry, and intact, no bleeding or hematoma present. Educated on when to resume metformin.

## 2021-04-06 NOTE — ROUTINE PROCESS
Cardiac Rehab: Spoke with patient regarding referral to cardiac rehab. Patient meets admission criteria based on PCI (4/5/21). Written information about Cardiac Rehab given and reviewed with patient. Discussed lifestyle modifications to promote cardiac wellness. Patient indicated that he does not wants to participate in the cardiac rehab program due to time and travel requirments. His Cardiologist is Dr. Austyn Wright. Thank you, ILENE Wheatley, RN Cardiopulmonary Rehabilitation Nurse Liaison [facility]

## 2021-04-06 NOTE — DISCHARGE INSTRUCTIONS
Patient Education        Coronary Angioplasty: What to Expect at Home  Your Recovery     Coronary angioplasty is a procedure that is used to open a narrowed or blocked coronary artery. It may also be called a percutaneous coronary intervention (PCI). The doctor opened your narrowed or blocked artery by putting a thin tube, called a catheter, into your heart through a blood vessel. The catheter was inserted into the blood vessel in your groin or arm. The doctor may have placed a small tube, called a stent, in the artery. Your groin or arm may have a bruise and feel sore for a day or two after the procedure. You can do light activities around the house. But don't do anything strenuous for a day or two. This care sheet gives you a general idea about how long it will take for you to recover. But each person recovers at a different pace. Follow the steps below to get better as quickly as possible. How can you care for yourself at home? Activity    · If the doctor gave you a sedative:  ? For 24 hours, don't do anything that requires attention to detail, such as going to work, making important decisions, or signing any legal documents. It takes time for the medicine's effects to completely wear off.  ? For your safety, do not drive or operate any machinery that could be dangerous. Wait until the medicine wears off and you can think clearly and react easily.     · Do not do strenuous exercise and do not lift, pull, or push anything heavy until your doctor says it is okay. This may be for a day or two. You can walk around the house and do light activity, such as cooking.     · If the catheter was placed in your groin, try not to walk up stairs for the first couple of days.     · If the catheter was placed in your arm near your wrist, do not bend your wrist deeply for the first couple of days.  Be careful using your hand to get into and out of a chair or bed.     · Carry your stent identification card with you at all times.     · If your doctor recommends it, get more exercise. Walking is a good choice. Bit by bit, increase the amount you walk every day. Try for at least 30 minutes on most days of the week.     · If you haven't been set up with a cardiac rehab program, talk to your doctor about whether rehab is right for you. Cardiac rehab includes supervised exercise. It also includes help with diet and lifestyle changes and emotional support. Diet    · Drink plenty of fluids to help your body flush out the dye. If you have kidney, heart, or liver disease and have to limit fluids, talk with your doctor before you increase the amount of fluids you drink.     · Keep eating a heart-healthy diet that has lots of fruits, vegetables, and whole grains. If you have not been eating this way, talk to your doctor. You also may want to talk to a dietitian. This expert can help you to learn about healthy foods and plan meals. Medicines    · Your doctor will tell you if and when you can restart your medicines. Your doctor will also give you instructions about taking any new medicines.     · If you take aspirin or some other blood thinner, ask your doctor if and when to start taking it again. Make sure that you understand exactly what your doctor wants you to do.     · Your doctor will prescribe blood-thinning medicines. You will likely take aspirin plus another antiplatelet, such as clopidogrel (Plavix). It is very important that you take these medicines exactly as directed. These medicines help keep the coronary artery open and reduce your risk of a heart attack.     · Call your doctor if you think you are having a problem with your medicine. Care of the catheter site    · For 1 or 2 days, keep a bandage over the spot where the catheter was inserted. The bandage probably will fall off in this time.     · Put ice or a cold pack on the area for 10 to 20 minutes at a time to help with soreness or swelling.  Put a thin cloth between the ice and your skin.     · You may shower 24 to 48 hours after the procedure, if your doctor okays it. Pat the incision dry.     · Do not soak the catheter site until it is healed. Don't take a bath for 1 week, or until your doctor tells you it is okay.     · Watch for bleeding from the site. A small amount of blood (up to the size of a quarter) on the bandage can be normal.     · If you are bleeding, lie down and press on the area for 15 minutes to try to make it stop. If the bleeding does not stop, call your doctor or seek immediate medical care. Follow-up care is a key part of your treatment and safety. Be sure to make and go to all appointments, and call your doctor if you are having problems. It's also a good idea to know your test results and keep a list of the medicines you take. When should you call for help? Call 911 anytime you think you may need emergency care. For example, call if:    · You passed out (lost consciousness).     · You have severe trouble breathing.     · You have sudden chest pain and shortness of breath, or you cough up blood.     · You have symptoms of a heart attack, such as:  ? Chest pain or pressure. ? Sweating. ? Shortness of breath. ? Nausea or vomiting. ? Pain that spreads from the chest to the neck, jaw, or one or both shoulders or arms. ? Dizziness or lightheadedness. ? A fast or uneven pulse. After calling 911, chew 1 adult-strength aspirin. Wait for an ambulance. Do not try to drive yourself.     · You have been diagnosed with angina, and you have angina symptoms that do not go away with rest or are not getting better within 5 minutes after you take one dose of nitroglycerin.    Call your doctor now or seek immediate medical care if:    · You are bleeding from the area where the catheter was put in your artery.     · You have a fast-growing, painful lump at the catheter site.     · You have signs of infection, such as:  ? Increased pain, swelling, warmth, or redness. ? Red streaks leading from the catheter site. ? Pus draining from the catheter site. ? A fever.     · Your leg, arm, or hand is painful, looks blue, or feels cold, numb, or tingly. Watch closely for changes in your health, and be sure to contact your doctor if you have any problems. Where can you learn more? Go to http://www.gray.com/  Enter V939 in the search box to learn more about \"Coronary Angioplasty: What to Expect at Home. \"  Current as of: August 31, 2020               Content Version: 12.8  © 2006-2021 [employer]. Care instructions adapted under license by [employer] (which disclaims liability or warranty for this information). If you have questions about a medical condition or this instruction, always ask your healthcare professional. [address] any warranty or liability for your use of this information.

## 2021-04-21 PROBLEM — I25.10 CORONARY ARTERY DISEASE INVOLVING NATIVE CORONARY ARTERY OF NATIVE HEART: Status: ACTIVE | Noted: 2021-04-21

## 2022-03-18 PROBLEM — I48.0 PAROXYSMAL ATRIAL FIBRILLATION (HCC): Status: ACTIVE | Noted: 2019-12-19

## 2022-03-19 PROBLEM — E66.01 OBESITY, MORBID (HCC): Status: ACTIVE | Noted: 2018-01-05

## 2022-03-19 PROBLEM — I10 ESSENTIAL HYPERTENSION: Status: ACTIVE | Noted: 2018-01-05

## 2022-03-20 PROBLEM — Z98.61 POST PTCA: Status: ACTIVE | Noted: 2021-04-05

## 2022-03-20 PROBLEM — I25.10 CORONARY ARTERY DISEASE INVOLVING NATIVE CORONARY ARTERY OF NATIVE HEART: Status: ACTIVE | Noted: 2021-04-21

## 2022-05-24 ENCOUNTER — TELEPHONE (OUTPATIENT)
Dept: CARDIOLOGY CLINIC | Age: 67
End: 2022-05-24

## 2022-05-24 NOTE — TELEPHONE ENCOUNTER
Pt is calling requesting a Rx for Eliquis 5 mg   90 day supply with 3 refills  [facility] [phone]

## 2022-05-24 NOTE — TELEPHONE ENCOUNTER
Prescription sent to pharmacy//[email or web address]  Requested Prescriptions     Signed Prescriptions Disp Refills    apixaban (ELIQUIS) 5 MG TABS tablet 180 tablet 3     Sig: Take 1 tablet by mouth 2 times daily     Authorizing Provider: Jennifer Harris     Ordering User: Lisset Hathaway

## 2022-10-18 ENCOUNTER — OFFICE VISIT (OUTPATIENT)
Dept: ORTHOPEDIC SURGERY | Age: 67
End: 2022-10-18
Payer: MEDICARE

## 2022-10-18 ENCOUNTER — TELEPHONE (OUTPATIENT)
Dept: CARDIOLOGY CLINIC | Age: 67
End: 2022-10-18

## 2022-10-18 VITALS — BODY MASS INDEX: 42.66 KG/M2 | HEIGHT: 72 IN | WEIGHT: 315 LBS

## 2022-10-18 DIAGNOSIS — M25.551 RIGHT HIP PAIN: ICD-10-CM

## 2022-10-18 DIAGNOSIS — M47.816 LUMBAR FACET ARTHROPATHY: ICD-10-CM

## 2022-10-18 DIAGNOSIS — M54.50 LOW BACK PAIN, UNSPECIFIED BACK PAIN LATERALITY, UNSPECIFIED CHRONICITY, UNSPECIFIED WHETHER SCIATICA PRESENT: Primary | ICD-10-CM

## 2022-10-18 DIAGNOSIS — M54.16 LUMBAR RADICULOPATHY: ICD-10-CM

## 2022-10-18 DIAGNOSIS — M51.36 LUMBAR DEGENERATIVE DISC DISEASE: ICD-10-CM

## 2022-10-18 PROCEDURE — 1123F ACP DISCUSS/DSCN MKR DOCD: CPT | Performed by: NURSE PRACTITIONER

## 2022-10-18 PROCEDURE — G8417 CALC BMI ABV UP PARAM F/U: HCPCS | Performed by: NURSE PRACTITIONER

## 2022-10-18 PROCEDURE — G8484 FLU IMMUNIZE NO ADMIN: HCPCS | Performed by: NURSE PRACTITIONER

## 2022-10-18 PROCEDURE — G8427 DOCREV CUR MEDS BY ELIG CLIN: HCPCS | Performed by: NURSE PRACTITIONER

## 2022-10-18 PROCEDURE — 99204 OFFICE O/P NEW MOD 45 MIN: CPT | Performed by: NURSE PRACTITIONER

## 2022-10-18 PROCEDURE — 1036F TOBACCO NON-USER: CPT | Performed by: NURSE PRACTITIONER

## 2022-10-18 PROCEDURE — 3017F COLORECTAL CA SCREEN DOC REV: CPT | Performed by: NURSE PRACTITIONER

## 2022-10-18 RX ORDER — AMLODIPINE BESYLATE 10 MG/1
10 TABLET ORAL DAILY
COMMUNITY
Start: 2022-04-27

## 2022-10-18 RX ORDER — METHOCARBAMOL 500 MG/1
500 TABLET, FILM COATED ORAL 3 TIMES DAILY PRN
Qty: 20 TABLET | Refills: 0 | Status: SHIPPED | OUTPATIENT
Start: 2022-10-18 | End: 2022-10-28

## 2022-10-18 RX ORDER — FLUTICASONE PROPIONATE 50 MCG
1 SPRAY, SUSPENSION (ML) NASAL DAILY
COMMUNITY
Start: 2020-06-25

## 2022-10-18 RX ORDER — TRAMADOL HYDROCHLORIDE 50 MG/1
50 TABLET ORAL EVERY 8 HOURS PRN
Qty: 15 TABLET | Refills: 0 | Status: SHIPPED | OUTPATIENT
Start: 2022-10-18 | End: 2022-10-23

## 2022-10-18 NOTE — TELEPHONE ENCOUNTER
Patient called and informed that once the doctor has a plan of care and treatment, if he needs to hold his Eliquis, the office will contact us and we will go from there.  Patient voiced understanding//[email or web address]

## 2022-10-18 NOTE — PROGRESS NOTES
Name: Monique Wilcox  YOB: 1955  Gender: male  MRN: [id number]    CC: Back pain, right leg pain, right groin pain    HPI: This is a 79y.o. year old male who has had back pain for many years. He has been maintaining this with chiropractic care. However he did have an incident where a lawnmower flipped on top of him a few months ago and prior to that started to develop increasing complaints of right lower back and hip pain. Pain goes down the anterior lateral leg to his knee into his ankle. He also can get right groin pain. It is worse with standing and activity. 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 He is a diabetic. States he needs something for pain due to the fact that Tylenol does not help and it is interfering with his sleep and activity. The patient denies any change in bowel or bladder function since the onset of the symptoms. he  has not had lumbar surgery in the past.      Thus far, the patient has tried tylenol and chiropractic care 3 months    Current pain level: 10  Activities limited by pain: All activities       AMB PAIN ASSESSMENT 10/18/2022   Location of Pain Back   Location Modifiers Right   Severity of Pain 10   Frequency of Pain Constant   Limiting Behavior Yes   Result of Injury No   Work-Related Injury No   Are there other pain locations you wish to document? No            ROS/Meds/PSH/PMH/FH/SH: I personally reviewed the patient's collected intake data.   Below are the pertinents:    Allergies   Allergen Reactions    Aspirin Other (See Comments)     epistaxis    Ibuprofen Other (See Comments)     epistaxis         Current Outpatient Medications:     amLODIPine (NORVASC) 10 MG tablet, Take 10 mg by mouth daily, Disp: , Rfl:     fluticasone (FLONASE) 50 MCG/ACT nasal spray, 1 spray by Nasal route daily, Disp: , Rfl:     methocarbamol (ROBAXIN) 500 MG tablet, Take 1 tablet by mouth 3 times daily as needed (for muscle spasm), Disp: 20 tablet, Rfl: 0    traMADol (ULTRAM) 50 MG tablet, Take 1 tablet by mouth every 8 hours as needed for Pain for up to 5 days. Intended supply: 7 days. Take lowest dose possible to manage pain, Disp: 15 tablet, Rfl: 0    apixaban (ELIQUIS) 5 MG TABS tablet, Take 1 tablet by mouth 2 times daily, Disp: 180 tablet, Rfl: 3    aspirin 81 MG EC tablet, Take 81 mg by mouth daily, Disp: , Rfl:     atorvastatin (LIPITOR) 40 MG tablet, Take 40 mg by mouth daily, Disp: , Rfl:     metFORMIN (GLUCOPHAGE) 850 MG tablet, Take 850 mg by mouth 2 times daily, Disp: , Rfl:     metoprolol (LOPRESSOR) 100 MG tablet, Take 100 mg by mouth 2 times daily, Disp: , Rfl:     verapamil (CALAN SR) 180 MG extended release tablet, Take 180 mg by mouth, Disp: , Rfl:     omeprazole (PRILOSEC) 40 MG delayed release capsule, Take 40 mg by mouth daily (Patient not taking: Reported on 10/18/2022), Disp: , Rfl:     History reviewed. No pertinent surgical history. Patient Active Problem List   Diagnosis    Paroxysmal atrial fibrillation (HCC)    Essential hypertension    Obesity    Obesity, morbid ([address]    Pulmonary embolus (HCC)    GERD (gastroesophageal reflux disease)    Coronary artery disease involving native coronary artery of native heart    DVT of leg (deep venous thrombosis) (HCC)    Post PTCA     Tobacco:  reports that he has quit smoking. He has never used smokeless tobacco.  Alcohol:   Social History     Substance and Sexual Activity   Alcohol Use No          Physical Exam:   BMI: Body mass index is 45.6 kg/m². GENERAL:  Adult in no acute distress, severely obese Patient is appropriately conversant  MSK:  Examination of the lumbar spine reveals paraspinal tenderness    There is moderate tenderness to palpation along the spinous processes and paraspinal musculature. The patient ambulates with a unsteady gait. ROM of bilateral hip(s) reveals mild right hip irritability  NEURO:  Cranial nerves grossly intact. No motor deficits.     Straight leg testing is positive right  Sensory testing reveals intact sensation to light touch and in the distribution of the L3-S1 dermatomes bilaterally  Ankle jerk is negative for clonus    Reflexes   Right Left   Quadriceps (L4) 2 2   Achilles (S1) 2 2     Strength testing in the lower extremity reveals the following based on the 5 point grading scale:     HF (L2) H Ab (L5) KE (L3/4) ADF (L4) EHL (L5) A Ev (S1) APF (S1)   Right 5 5 5 5 5 5 5   Left 5 5 5 5 5 5 5     PSYCH:  Alert and oriented X 3. Appropriate affect. Intact judgment and insight. Radiographic Studies:     AP, lateral and spot views of the lumbar spine:      Patient has lower lumbar facet arthropathy. There is anterior osteophyte spurring. There is diffuse disc degeneration throughout. Interpretation: Lower lumbar spondylosis and disc degeneration. X-ray including AP and lateral views of the right hip reviewed today: Mild degenerative changes of the acetabulum. Joint space is still moderately maintained. No fractures or lesions. Interpretation: Mild hip osteoarthritis    Assessment/Plan:       Diagnosis Orders   1. Low back pain, unspecified back pain laterality, unspecified chronicity, unspecified whether sciatica present  XR LUMBAR SPINE (2-3 VIEWS)      2. Lumbar radiculopathy  MRI LUMBAR SPINE WO CONTRAST    traMADol (ULTRAM) 50 MG tablet      3. Lumbar degenerative disc disease  MRI LUMBAR SPINE WO CONTRAST      4. Lumbar facet arthropathy  MRI LUMBAR SPINE WO CONTRAST      5. Right hip pain  XR HIP RIGHT (2-3 VIEWS)          This patient's clinical history and physical exam is consistent with a right  L-3 and L-4 lumbar radiculopathy. We discussed the natural history of lumbar radiculopathy in that many of these patients have near complete resolution of their symptoms within eight to twelve weeks with conservative care.  We discussed that conservative treatments typically start with activity modification, and medication followed by physical therapy as symptoms allow. Oral and/or epidural steroids are other options. I also discussed potential surgical options if the symptoms fail to improve or there is a progressive neurologic deficit and conservative management has been exhausted. We discussed that surgery is not typically a reliable treatment for isolated back pain, but is usually very reliable in relieving buttock and leg symptoms.        - Muscle Relaxant: The patient was prescribed a muscle relaxant. The patient understands that this is a temporary measure to bring acute spasm under control.    - Opioid: The patient was prescribed an oral pain medication. The patient understands that this is a temporary measure to bring acute or post-surgical pain under control and that it is out of the scope of this practice to continue opiates on a long-term basis. The [place] Controlled Substance database was reviewed prior to prescribing.   - A MRI was ordered to delineate anatomy, confirm the diagnosis and assess the severity. 4 This is a undiagnosed new problem with uncertain prognosis    Orders Placed This Encounter   Medications    methocarbamol (ROBAXIN) 500 MG tablet     Sig: Take 1 tablet by mouth 3 times daily as needed (for muscle spasm)     Dispense:  20 tablet     Refill:  0    traMADol (ULTRAM) 50 MG tablet     Sig: Take 1 tablet by mouth every 8 hours as needed for Pain for up to 5 days. Intended supply: 7 days. Take lowest dose possible to manage pain     Dispense:  15 tablet     Refill:  0     Reduce doses taken as pain becomes manageable        Orders Placed This Encounter   Procedures    XR LUMBAR SPINE (2-3 VIEWS)    MRI LUMBAR SPINE WO CONTRAST    XR HIP RIGHT (2-3 VIEWS)            Return for MRI results. ANEESH Oleary - CNP  10/18/22      Elements of this note were created using speech recognition software. As such, errors of speech recognition may be present.

## 2022-10-18 NOTE — LETTER
[address],[facility] [address]  Phone: [phone]  Fax: [phone]    ANEESH Allen CNP        October 18, 2022     Patient: Nan Lee   YOB: 1955   Date of Visit: 10/18/2022       Patient under my care for his spine complaints. We are wanting to get him scheduled for a lumbar steriod injection. He would have to discontinue plavix x 7 days and he would resume day after . If you will please let us know if patient is low risk to stop medication. You may contact ESTELLA Lopez at [phone] and or place response in patient epic.     ESTELLA York APRN - CNP

## 2022-10-18 NOTE — TELEPHONE ENCOUNTER
Patient called stated that he is scheduled to see a back specialist and wanted to talk to [address] [place] [address] regarding his Eliquis and if they decide to do an injection will he need to hold meds? Please review and follow up with patient.  Thanks

## 2022-10-20 ENCOUNTER — TELEPHONE (OUTPATIENT)
Dept: CARDIOLOGY CLINIC | Age: 67
End: 2022-10-20

## 2022-10-20 NOTE — TELEPHONE ENCOUNTER
Patient having Lumbar Steriod Injection on TBD with [facility]. Requesting Plavix hold for 7 days prior and resum day after. Fax: [phone]

## 2022-10-20 NOTE — TELEPHONE ENCOUNTER
He is not on Plavix. He is on Eliquis and aspirin. He should continue aspirin. He is on Eliquis for history of pulmonary emboli. If need to hold for 7 days would need a Lovenox bridge. Would plan to start Lovenox 40 mg subcu daily ,  24 hours after Eliquis has been held. Would hold 24 hours prior to surgery and resume Eliquis postoperatively as allowed by surgery. Faxed DR. Graham's response//[email or web address]

## 2022-10-20 NOTE — TELEPHONE ENCOUNTER
Patient having Lumbar Steriod Injection on TBD with [facility]. Requesting Plavix hold for 7 days prior and resum day after.     Last office visit 4.27.22  Last echo 3.24.21      Fax: [phone]

## 2022-10-21 ENCOUNTER — TELEPHONE (OUTPATIENT)
Dept: ORTHOPEDIC SURGERY | Age: 67
End: 2022-10-21

## 2022-10-21 NOTE — TELEPHONE ENCOUNTER
He was seen recently and prescribed Tramadol and Robaxin and his pain is still not improving. Please call to discuss what else can be done.

## 2022-10-24 ENCOUNTER — OFFICE VISIT (OUTPATIENT)
Dept: ORTHOPEDIC SURGERY | Age: 67
End: 2022-10-24
Payer: MEDICARE

## 2022-10-24 DIAGNOSIS — M48.061 STENOSIS OF LATERAL RECESS OF LUMBAR SPINE: Primary | ICD-10-CM

## 2022-10-24 DIAGNOSIS — M51.16 LUMBAR DISC HERNIATION WITH RADICULOPATHY: ICD-10-CM

## 2022-10-24 PROCEDURE — 1036F TOBACCO NON-USER: CPT | Performed by: NURSE PRACTITIONER

## 2022-10-24 PROCEDURE — G8417 CALC BMI ABV UP PARAM F/U: HCPCS | Performed by: NURSE PRACTITIONER

## 2022-10-24 PROCEDURE — G8427 DOCREV CUR MEDS BY ELIG CLIN: HCPCS | Performed by: NURSE PRACTITIONER

## 2022-10-24 PROCEDURE — 1123F ACP DISCUSS/DSCN MKR DOCD: CPT | Performed by: NURSE PRACTITIONER

## 2022-10-24 PROCEDURE — G8484 FLU IMMUNIZE NO ADMIN: HCPCS | Performed by: NURSE PRACTITIONER

## 2022-10-24 PROCEDURE — 3017F COLORECTAL CA SCREEN DOC REV: CPT | Performed by: NURSE PRACTITIONER

## 2022-10-24 PROCEDURE — 99214 OFFICE O/P EST MOD 30 MIN: CPT | Performed by: NURSE PRACTITIONER

## 2022-10-24 RX ORDER — TRAMADOL HYDROCHLORIDE 50 MG/1
50-100 TABLET ORAL EVERY 6 HOURS PRN
Qty: 42 TABLET | Refills: 0 | Status: SHIPPED | OUTPATIENT
Start: 2022-10-24 | End: 2022-10-31

## 2022-10-24 RX ORDER — GABAPENTIN 100 MG/1
100-300 CAPSULE ORAL NIGHTLY
Qty: 90 CAPSULE | Refills: 0 | Status: SHIPPED | OUTPATIENT
Start: 2022-10-24 | End: 2022-11-09

## 2022-10-24 NOTE — PROGRESS NOTES
Name: Paul Vallejo  YOB: 1955  Gender: male  MRN: [id number]    CC: Follow-up acute right low back and leg pain    HPI: This is a 79y.o. year old male who has a history of PE, DVT, daily blood thinner use, diabetes, morbid obesity and a history of chronic back pain. Few months ago patient had an incident where a lawnmower flipped on top of him and he started having increasing complaints of right lower back pain rating around to the hip down the anterior leg to his knee and into the anterior shin to his ankle. Worse with standing and activity. He states the pain has become severe. At last visit I started patient on tramadol 50 mg 3 times a day in addition to Robaxin. I also obtained an MRI of the lumbar spine. He states that his sleep is still being interfered with. He cannot sit still in the bed at night due to elevated levels of pain. X-rays revealed degenerative disc disease and facet arthropathy. Hip x-rays revealed very minimal age-appropriate degenerative hip changes. Pain is a 10 out of 10. AMB PAIN ASSESSMENT 10/18/2022   Location of Pain Back   Location Modifiers Right   Severity of Pain 10   Frequency of Pain Constant   Limiting Behavior Yes   Result of Injury No   Work-Related Injury No   Are there other pain locations you wish to document? No            Allergies   Allergen Reactions    Aspirin Other (See Comments)     epistaxis    Ibuprofen Other (See Comments)     epistaxis       Current Outpatient Medications:     gabapentin (NEURONTIN) 100 MG capsule, Take 1-3 capsules by mouth nightly for 30 days. , Disp: 90 capsule, Rfl: 0    traMADol (ULTRAM) 50 MG tablet, Take 1-2 tablets by mouth every 6 hours as needed for Pain for up to 7 days. Intended supply: 7 days.  Take lowest dose possible to manage pain, Disp: 42 tablet, Rfl: 0    amLODIPine (NORVASC) 10 MG tablet, Take 10 mg by mouth daily, Disp: , Rfl:     fluticasone (FLONASE) 50 MCG/ACT nasal spray, 1 spray by Nasal route daily, Disp: , Rfl:     methocarbamol (ROBAXIN) 500 MG tablet, Take 1 tablet by mouth 3 times daily as needed (for muscle spasm), Disp: 20 tablet, Rfl: 0    apixaban (ELIQUIS) 5 MG TABS tablet, Take 1 tablet by mouth 2 times daily, Disp: 180 tablet, Rfl: 3    aspirin 81 MG EC tablet, Take 81 mg by mouth daily, Disp: , Rfl:     atorvastatin (LIPITOR) 40 MG tablet, Take 40 mg by mouth daily, Disp: , Rfl:     metFORMIN (GLUCOPHAGE) 850 MG tablet, Take 850 mg by mouth 2 times daily, Disp: , Rfl:     metoprolol (LOPRESSOR) 100 MG tablet, Take 100 mg by mouth 2 times daily, Disp: , Rfl:     omeprazole (PRILOSEC) 40 MG delayed release capsule, Take 40 mg by mouth daily (Patient not taking: Reported on 10/18/2022), Disp: , Rfl:     verapamil (CALAN SR) 180 MG extended release tablet, Take 180 mg by mouth, Disp: , Rfl:   Past Medical History:   Diagnosis Date    Arthritis     Coronary artery disease involving native coronary artery of native heart 4/21/2021    DVT of leg (deep venous thrombosis) (HCC)     Epistaxis     Essential hypertension 1/5/2018    GERD (gastroesophageal reflux disease)     Hematuria, gross     Paroxysmal atrial fibrillation ([address] 12/19/2019    Pulmonary embolus (HCC)     Sleep apnea     cpap    UTI (urinary tract infection)      Tobacco:  reports that he has quit smoking. He has never used smokeless tobacco.  Alcohol:   Social History     Substance and Sexual Activity   Alcohol Use No          Radiographic Studies:       MRI Results (most recent):  EXAMINATION: MRI LUMBAR SPINE WO CONTRAST 10/21/2022 12:50 PM       ACCESSION NUMBER: [id number]       COMPARISON: Lumbar spine x-ray October 18, 2022       INDICATION: Radiculopathy, lumbar region;  Other intervertebral disc   degeneration, lumbar region; Spondylosis without myelopathy or radiculopathy,   lumbar region       TECHNIQUE: Multisequence, multiplanar MR images were obtained of the lumbar   spine without the administration of intravenous contrast.        FINDINGS:    NUMBERING: Last fully formed disc space is L5-S1. Suggestion of diminutive ribs   at T12   SPINAL CORD: Normal conus. The conus medullaris terminates at the L1 vertebral   body level. BONES: Vertebral body height is maintained. No spinal malalignment. Modic type I   degenerative endplate changes S4-2 and L4-5 with Schmorl node formation at L4   inferior endplate. Soft tissues: The paravertebral soft tissues are within normal limits. Level specific findings:       T12-L1: No canal or foraminal stenosis. L1-L2: Small diffuse disc bulge with bilateral foraminal extension. No spinal   canal stenosis. Bilateral foraminal stenosis. L2-L3: Diffuse disc bulge with left greater than right foraminal extension. Mild   facet ligamentum flavum hypertrophy. Mild spinal canal stenosis. Mild bilateral   foraminal stenosis. L3-L4: Diffuse disc bulge with right foraminal extrusion with superior migration   within the right neuroforamina. Moderate right greater than left facet and   ligamentum flavum hypertrophy. Moderate spinal canal stenosis with stenosis of   the right subarticular recess. Severe right foraminal stenosis and no left   foraminal stenosis       L4-L5: Diffuse disc bulge with right greater than left foraminal extension. Moderate bilateral facet and ligamentum flavum hypertrophy. Mild spinal canal   stenosis. Right moderate foraminal stenosis but appearance of compression of   exiting right L4 nerve root by disc and facet osteophyte. Mild to moderate left   foraminal stenosis. L5-S1: Small diffuse disc bulge with right subarticular annular fissure. No   spinal canal stenosis. No foraminal stenosis. Impression   1. Degenerative changes lumbar spine most prominent L3-4 with moderate spinal   canal stenosis and right subarticular recess stenosis.    2.  Right L3-4 foraminal disc extrusion with superior migration producing severe   right foraminal stenosis at this level. 3.  L4-5 degenerative changes with overall moderate right foraminal stenosis but   with appearance of compression of the exiting nerve root between disc and facet   osteophyte. Assessment/Plan:        ICD-10-CM    1. Stenosis of lateral recess of lumbar spine  M48.061 traMADol (ULTRAM) 50 MG tablet     Amb External Referral To Pain Medicine     [facility]      2. Lumbar disc herniation with radiculopathy  M51.16 traMADol (ULTRAM) 50 MG tablet     Amb External Referral To Pain Medicine     [facility]           I reviewed patient's MRI findings and images with him. Unfortunately he has an acute right L3-4 disc herniation causing severe right L3 foraminal stenosis, there is lateral recess stenosis at this level in addition to advanced foraminal stenosis that looks like it is chronic on the right at L4. Ultimately patient would be a candidate for a hemilaminectomy but his weight and medical comorbidities do not make him a candidate at our office. I did review patient's clinical status and MRI findings with Dr. Ashish Bonilla. Unfortunately he is a high risk patient. I discussed this with patient and I will gladly refer him to pain management. He did ask for a second opinion to [place] [facility]. I will be glad to send this referral accordingly. The interim I told patient to increase his tramadol. Have given him a new prescription for 50 mg 1-2 p.o. every 6 to 8 hours as needed. I will also start him on gabapentin at night. We will get the pain management referral expedited. - Neuropathic pain treatment: For neuropathic pain relief the patient was given a prescription and counseled regarding the possibility of risks and complications from this medication.  - Opioid: The patient was prescribed an oral pain medication.  The patient understands that this is a temporary measure to bring acute or post-surgical pain under control and that it is out of the scope of this practice to continue opiates on a long-term basis. The [place] Controlled Substance database was reviewed prior to prescribing.   - Referral to pain management. The patient's care would be best managed in a formal pain management setting and will be referred accordingly. Orders Placed This Encounter   Medications    gabapentin (NEURONTIN) 100 MG capsule     Sig: Take 1-3 capsules by mouth nightly for 30 days. Dispense:  90 capsule     Refill:  0    traMADol (ULTRAM) 50 MG tablet     Sig: Take 1-2 tablets by mouth every 6 hours as needed for Pain for up to 7 days. Intended supply: 7 days. Take lowest dose possible to manage pain     Dispense:  42 tablet     Refill:  0     Reduce doses taken as pain becomes manageable        Orders Placed This Encounter   Procedures    Amb External Referral To Pain Medicine    [address] [facility]        4 This is an acute complicated injury      Return for refer to pain management, refer to [facility]. ANEESH Rogers CNP  10/24/22      Elements of this note were created using speech recognition software. As such, errors of speech recognition may be present.

## 2022-10-26 ENCOUNTER — TELEPHONE (OUTPATIENT)
Dept: CARDIOLOGY CLINIC | Age: 67
End: 2022-10-26

## 2022-10-26 NOTE — PROGRESS NOTES
Call elizabeth to change to eliquis and to find out if the patient will need lovenox bridge . Kirill get back with me after speaking with Dr Syd Kumari .

## 2022-10-26 NOTE — TELEPHONE ENCOUNTER
[facility]an with [facility] stating they had received the medication hold for Plavix. However, the patient is on Eliquis and POA is asking for a 3-5 day prior hold. Ayah Tran states she would like to know if a Lovenox Bridge is needed as well. Please call and advise.     [phone]

## 2022-10-27 NOTE — TELEPHONE ENCOUNTER
Called Kvng Corea at [facility], she wanted to make sure that patient was to be on Lovenox bridge with him being on Eliquis. Kvng Corea informed that patient should be on Lovenox bridge as per Dr. Bakari Peña note that was faxed to her.  Kvng Corea voiced understanding//zuleikaab

## 2022-11-03 ENCOUNTER — TELEPHONE (OUTPATIENT)
Dept: CARDIOLOGY CLINIC | Age: 67
End: 2022-11-03

## 2022-11-03 NOTE — TELEPHONE ENCOUNTER
Wants to talk to the nurse about stopping his Eliquis and ASA .  He is to have a lovonox bridge Please call ASAP

## 2022-11-03 NOTE — TELEPHONE ENCOUNTER
Patient called wanting to know about his Lovenox shots and when he should start them. He has gone to another pain management doctor and needs clarification.  Patient has follow up appointment on 11-9-22 with Dr. Norman Silva and will discuss with him at that appointment//ting

## 2022-11-08 ENCOUNTER — CLINICAL DOCUMENTATION (OUTPATIENT)
Dept: ORTHOPEDIC SURGERY | Age: 67
End: 2022-11-08

## 2022-11-09 ENCOUNTER — OFFICE VISIT (OUTPATIENT)
Dept: NEUROSURGERY | Age: 67
End: 2022-11-09
Payer: MEDICARE

## 2022-11-09 ENCOUNTER — OFFICE VISIT (OUTPATIENT)
Dept: CARDIOLOGY CLINIC | Age: 67
End: 2022-11-09
Payer: MEDICARE

## 2022-11-09 VITALS
BODY MASS INDEX: 42.53 KG/M2 | WEIGHT: 314 LBS | OXYGEN SATURATION: 96 % | SYSTOLIC BLOOD PRESSURE: 130 MMHG | HEART RATE: 64 BPM | HEIGHT: 72 IN | TEMPERATURE: 96.8 F | DIASTOLIC BLOOD PRESSURE: 74 MMHG

## 2022-11-09 VITALS
WEIGHT: 314.4 LBS | BODY MASS INDEX: 42.58 KG/M2 | HEART RATE: 68 BPM | DIASTOLIC BLOOD PRESSURE: 74 MMHG | HEIGHT: 72 IN | SYSTOLIC BLOOD PRESSURE: 132 MMHG

## 2022-11-09 DIAGNOSIS — E66.01 OBESITY, CLASS III, BMI 40-49.9 (MORBID OBESITY) (HCC): ICD-10-CM

## 2022-11-09 DIAGNOSIS — E66.01 OBESITY, MORBID (HCC): ICD-10-CM

## 2022-11-09 DIAGNOSIS — I48.0 PAROXYSMAL ATRIAL FIBRILLATION (HCC): Primary | ICD-10-CM

## 2022-11-09 DIAGNOSIS — M51.26 LUMBAR DISC HERNIATION: ICD-10-CM

## 2022-11-09 DIAGNOSIS — M51.36 DEGENERATIVE DISC DISEASE, LUMBAR: ICD-10-CM

## 2022-11-09 DIAGNOSIS — I10 ESSENTIAL HYPERTENSION: ICD-10-CM

## 2022-11-09 DIAGNOSIS — Z86.711 HISTORY OF PULMONARY EMBOLISM: ICD-10-CM

## 2022-11-09 DIAGNOSIS — M54.16 LUMBAR RADICULOPATHY: Primary | ICD-10-CM

## 2022-11-09 DIAGNOSIS — I25.10 CORONARY ARTERY DISEASE INVOLVING NATIVE CORONARY ARTERY OF NATIVE HEART WITHOUT ANGINA PECTORIS: ICD-10-CM

## 2022-11-09 PROCEDURE — 1123F ACP DISCUSS/DSCN MKR DOCD: CPT | Performed by: INTERNAL MEDICINE

## 2022-11-09 PROCEDURE — 3017F COLORECTAL CA SCREEN DOC REV: CPT | Performed by: NEUROLOGICAL SURGERY

## 2022-11-09 PROCEDURE — 99214 OFFICE O/P EST MOD 30 MIN: CPT | Performed by: INTERNAL MEDICINE

## 2022-11-09 PROCEDURE — 99204 OFFICE O/P NEW MOD 45 MIN: CPT | Performed by: NEUROLOGICAL SURGERY

## 2022-11-09 PROCEDURE — 1036F TOBACCO NON-USER: CPT | Performed by: INTERNAL MEDICINE

## 2022-11-09 PROCEDURE — 3017F COLORECTAL CA SCREEN DOC REV: CPT | Performed by: INTERNAL MEDICINE

## 2022-11-09 PROCEDURE — G8427 DOCREV CUR MEDS BY ELIG CLIN: HCPCS | Performed by: NEUROLOGICAL SURGERY

## 2022-11-09 PROCEDURE — G8417 CALC BMI ABV UP PARAM F/U: HCPCS | Performed by: INTERNAL MEDICINE

## 2022-11-09 PROCEDURE — 3074F SYST BP LT 130 MM HG: CPT | Performed by: NEUROLOGICAL SURGERY

## 2022-11-09 PROCEDURE — 3078F DIAST BP <80 MM HG: CPT | Performed by: NEUROLOGICAL SURGERY

## 2022-11-09 PROCEDURE — G8427 DOCREV CUR MEDS BY ELIG CLIN: HCPCS | Performed by: INTERNAL MEDICINE

## 2022-11-09 PROCEDURE — 3074F SYST BP LT 130 MM HG: CPT | Performed by: INTERNAL MEDICINE

## 2022-11-09 PROCEDURE — G8417 CALC BMI ABV UP PARAM F/U: HCPCS | Performed by: NEUROLOGICAL SURGERY

## 2022-11-09 PROCEDURE — G8484 FLU IMMUNIZE NO ADMIN: HCPCS | Performed by: INTERNAL MEDICINE

## 2022-11-09 PROCEDURE — 3078F DIAST BP <80 MM HG: CPT | Performed by: INTERNAL MEDICINE

## 2022-11-09 PROCEDURE — G8484 FLU IMMUNIZE NO ADMIN: HCPCS | Performed by: NEUROLOGICAL SURGERY

## 2022-11-09 PROCEDURE — 1123F ACP DISCUSS/DSCN MKR DOCD: CPT | Performed by: NEUROLOGICAL SURGERY

## 2022-11-09 PROCEDURE — 1036F TOBACCO NON-USER: CPT | Performed by: NEUROLOGICAL SURGERY

## 2022-11-09 RX ORDER — AMLODIPINE BESYLATE 10 MG/1
10 TABLET ORAL DAILY
Qty: 90 TABLET | Refills: 3 | Status: SHIPPED | OUTPATIENT
Start: 2022-11-09 | End: 2022-11-09

## 2022-11-09 RX ORDER — METOPROLOL TARTRATE 100 MG/1
100 TABLET ORAL 2 TIMES DAILY
Qty: 180 TABLET | Refills: 3 | Status: SHIPPED | OUTPATIENT
Start: 2022-11-09

## 2022-11-09 RX ORDER — ATORVASTATIN CALCIUM 40 MG/1
40 TABLET, FILM COATED ORAL DAILY
Qty: 90 TABLET | Refills: 3 | Status: SHIPPED | OUTPATIENT
Start: 2022-11-09

## 2022-11-09 ASSESSMENT — ENCOUNTER SYMPTOMS
BACK PAIN: 1
SHORTNESS OF BREATH: 0
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
## 2022-11-09 NOTE — PROGRESS NOTES
[address], [address] [facility], [address] [place] [address]  PHONE: Apple Ruiz  1955      SUBJECTIVE:   Jyotsna Abdi is a 79 y.o. male seen for a follow up visit regarding the following:     Chief Complaint   Patient presents with    Coronary Artery Disease    Atrial Fibrillation       HPI:    Jyotsna Abdi presents for routine follow up for known Coronary Artery Disease. Multiple issues addressed as outlined below:     Coronary Artery Disease:  Patient denies any recent angina. Notes compliance with medical therapy. No recent NTG use. Prior DVT:  On Eliquis. No bleeding or excessive bruising. Hypertension:  Ambulatory BP readings have been controlled. Patient reports compliance with medical therapy without side effects. Hyperlipidemia:   Tolerating Lipitor. Herniated disk:  See Dr. Damien Saint today to discussed. Obesity:  Has lost 15 lbs since last visit. Past Medical History, Past Surgical History, Family history, Social History, and Medications were all reviewed with the patient today and updated as necessary.            Current Outpatient Medications:     amLODIPine (NORVASC) 10 MG tablet, Take 1 tablet by mouth daily, Disp: 90 tablet, Rfl: 3    apixaban (ELIQUIS) 5 MG TABS tablet, Take 1 tablet by mouth 2 times daily, Disp: 180 tablet, Rfl: 3    atorvastatin (LIPITOR) 40 MG tablet, Take 1 tablet by mouth daily, Disp: 90 tablet, Rfl: 3    metoprolol (LOPRESSOR) 100 MG tablet, Take 1 tablet by mouth 2 times daily, Disp: 180 tablet, Rfl: 3    fluticasone (FLONASE) 50 MCG/ACT nasal spray, 1 spray by Nasal route daily, Disp: , Rfl:     aspirin 81 MG EC tablet, Take 81 mg by mouth daily, Disp: , Rfl:     metFORMIN (GLUCOPHAGE) 850 MG tablet, Take 850 mg by mouth 2 times daily, Disp: , Rfl:     omeprazole (PRILOSEC) 40 MG delayed release capsule, Take 40 mg by mouth daily, Disp: , Rfl:      Allergies   Allergen Reactions    Aspirin Other (See Comments)     epistaxis    Ibuprofen Other (See Comments)     epistaxis        Patient Active Problem List    Diagnosis Date Noted    Post PTCA 04/05/2021     Priority: High    Coronary artery disease involving native coronary artery of native heart 04/21/2021     Priority: Low     1. PCI of LAD (4/5/21):  80% LAD 80% D1. Mild LCx and RCA stenosis. EF   60-65%. Overlapping Synergy REX. 3.5 x 12 and 2.75 x 38 mm REX. Adjunctive   POBA D1 with 2.25 balloon. IVUS post stent. Paroxysmal atrial fibrillation (HCC) 12/19/2019     Priority: Low     1. Noted during admission with Urosepsis at [facility] (11/19)  2.  Echo (11/19):  EF 50-54%. Grade I DD. Mild MR.   3.  Lexiscan Cardiolite (1/20/20):  EF 66%. Diaphragmatic attenuation. No ischemia. (done after low level troponin elevation during afib with   RVR with sepsis). 4.  Echo (3/24/21):  EF 60-65%. Moderate LAE. Mild ADITYA. Mild MR/TR. RVSP 32        Essential hypertension 01/05/2018     Priority: Low    Obesity, morbid ([address] 01/05/2018     Priority: Low    Obesity 09/12/2016     Priority: Low    History of pulmonary embolism 10/08/2010     Priority: Low     1. CT of chest (10/6/10):  bilateral saddle emboli. 2.  Echo (10/7/10): Normal LV systolic function. Mild to moderate LVH. EF   55-60%. Mild diastolic dysfunction. RV is dilated with severe hypokinesis. Mild biatrial enlargement. No effusion. RVSP 45 mm Hg. GERD (gastroesophageal reflux disease) 10/06/2010     Priority: Low    DVT of leg (deep venous thrombosis) ([address] 10/06/2010     Priority: Low     1. Venous ultrasound (1/11/11): Nonocclusive thrombus in the midportion   of the right femoral vein with occlusive thrombus in the distal femoral   vein and popliteal vein. Left-sided veins are patent. This appears to be   chronic when compared with study in October. There may be a very small   extension proximally in the mid left femoral vein.             Social History Tobacco Use    Smoking status: Former    Smokeless tobacco: Never    Tobacco comments:     Quit smoking: quit 15-20 years ago   Substance Use Topics    Alcohol use: No       ROS:    Review of Systems   Constitutional: Negative for malaise/fatigue. Cardiovascular:  Negative for chest pain. Respiratory:  Negative for shortness of breath. Musculoskeletal:  Positive for arthritis and back pain. Neurological:  Positive for paresthesias. Negative for focal weakness. Psychiatric/Behavioral:  Negative for depression. PHYSICAL EXAM:  Wt Readings from Last 3 Encounters:   11/09/22 (!) 314 lb 6.4 oz (142.6 kg)   10/18/22 (!) 331 lb 9.6 oz (150.4 kg)   04/27/22 (!) 329 lb (149.2 kg)     BP Readings from Last 3 Encounters:   11/09/22 132/74   04/27/22 136/66   10/21/21 134/66     Pulse Readings from Last 3 Encounters:   11/09/22 68   04/27/22 55   10/21/21 69       Physical Exam  Constitutional:       General: He is not in acute distress. Neck:      Vascular: No carotid bruit. Cardiovascular:      Rate and Rhythm: Normal rate and regular rhythm. Pulmonary:      Effort: No respiratory distress. Breath sounds: Normal breath sounds. Abdominal:      General: Bowel sounds are normal.      Palpations: Abdomen is soft. Musculoskeletal:         General: Swelling (trivial) present. Skin:     General: Skin is warm and dry. Neurological:      General: No focal deficit present. Psychiatric:         Mood and Affect: Mood normal.       Medical problems and test results were reviewed with the patient today.        Lab Results   Component Value Date    WBC 7.1 04/06/2021    HGB 14.3 04/06/2021    HCT 43.5 04/06/2021    MCV 90.1 04/06/2021     04/06/2021       Lab Results   Component Value Date/Time     04/06/2021 03:19 AM    K 4.0 04/06/2021 03:19 AM     04/06/2021 03:19 AM    CO2 26 04/06/2021 03:19 AM    BUN 14 04/06/2021 03:19 AM    CREATININE 0.89 04/06/2021 03:19 AM    GLUCOSE 99 04/06/2021 03:19 AM    CALCIUM 8.9 04/06/2021 03:19 AM        Lab Results   Component Value Date    CHOL 84 04/06/2021     Lab Results   Component Value Date    TRIG 187 (H) 04/06/2021     Lab Results   Component Value Date    HDL 29 (L) 04/06/2021     Lab Results   Component Value Date    LDLCALC 17.6 04/06/2021     Lab Results   Component Value Date    LABVLDL 37.4 (H) 04/06/2021     Lab Results   Component Value Date    CHOLHDLRATIO 2.9 04/06/2021        Data from outside records/labs from outside providers have been reviewed and summarized as noted in the HPI, past history and data review sections of this note       ASSESSMENT and PLAN      1. Paroxysmal atrial fibrillation (HCC)  No recurrence. On Eliquis. - apixaban (ELIQUIS) 5 MG TABS tablet; Take 1 tablet by mouth 2 times daily  Dispense: 180 tablet; Refill: 3    2. Essential hypertension  Currently BP appears to be under acceptable control on current therapy. Continue current medications including Lopressor, Norvasc. Discussed monitoring ambulatory BP readings to ensure continued optimal management. If BP becomes elevated, patient is encouraged to contact my office for further titration of therapy. - amLODIPine (NORVASC) 10 MG tablet; Take 1 tablet by mouth daily  Dispense: 90 tablet; Refill: 3  - metoprolol (LOPRESSOR) 100 MG tablet; Take 1 tablet by mouth 2 times daily  Dispense: 180 tablet; Refill: 3    3. Coronary artery disease involving native coronary artery of native heart without angina pectoris  Patient is doing well without any anginal symptoms. Continue Aspirin 81 mg a day and PRN NTG. We discussed the importance of continued risk factor modification. The patient is encouraged to contact my office with any worsening dyspnea or chest discomfort. 4. History of pulmonary embolism  Continue Eliquis. - apixaban (ELIQUIS) 5 MG TABS tablet; Take 1 tablet by mouth 2 times daily  Dispense: 180 tablet; Refill: 3    5.  Obesity,

## 2023-01-25 ENCOUNTER — HOSPITAL ENCOUNTER (OUTPATIENT)
Dept: GENERAL RADIOLOGY | Age: 68
Discharge: HOME | End: 2023-01-28
Payer: MEDICARE

## 2023-01-25 DIAGNOSIS — R52 PAIN: ICD-10-CM

## 2023-01-25 PROCEDURE — 73030 X-RAY EXAM OF SHOULDER: CPT

## 2023-01-25 PROCEDURE — 72070 X-RAY EXAM THORAC SPINE 2VWS: CPT

## 2023-02-13 ENCOUNTER — HOSPITAL ENCOUNTER (OUTPATIENT)
Dept: MRI IMAGING | Age: 68
Discharge: HOME | End: 2023-02-16
Payer: MEDICARE

## 2023-02-13 DIAGNOSIS — M54.12 CERVICAL RADICULITIS: ICD-10-CM

## 2023-02-13 PROCEDURE — 72141 MRI NECK SPINE W/O DYE: CPT

## 2023-02-21 ENCOUNTER — TELEPHONE (OUTPATIENT)
Dept: CARDIOLOGY CLINIC | Age: 68
End: 2023-02-21

## 2023-04-20 ENCOUNTER — TELEPHONE (OUTPATIENT)
Dept: CARDIOLOGY CLINIC | Age: 68
End: 2023-04-20

## 2023-04-20 NOTE — TELEPHONE ENCOUNTER
Received fax from [address] [place] [address] regarding patient assistance form for Eliquis. Spoke with Isamar Arguello, Needed to know if patient was being treated as outpatient or in patient. Isamar Arguello informed that patient was receiving Eliquis 5 mg bid as an outpatient.  Angelica updated form and will submit.//brendab

## 2023-06-15 PROBLEM — E78.2 MIXED HYPERLIPIDEMIA: Status: ACTIVE | Noted: 2023-06-15

## 2023-07-14 ENCOUNTER — TELEPHONE (OUTPATIENT)
Age: 68
End: 2023-07-14

## 2023-07-14 NOTE — TELEPHONE ENCOUNTER
Patient called stating his feet are swelling. The left one is worse than the right. No active chest pain or SOB.

## 2023-07-14 NOTE — TELEPHONE ENCOUNTER
Left message on voice mail that I returned call, call back on Monday will close note at this time//ting

## 2023-08-01 ENCOUNTER — TELEPHONE (OUTPATIENT)
Age: 68
End: 2023-08-01

## 2023-08-01 RX ORDER — FUROSEMIDE 40 MG/1
40 TABLET ORAL DAILY PRN
Qty: 30 TABLET | Refills: 5 | Status: SHIPPED | OUTPATIENT
Start: 2023-08-01

## 2023-08-01 NOTE — TELEPHONE ENCOUNTER
Patient is having swelling in his feet. Patient states he has to keep them raised when he is not doing something. Please call and advise.

## 2023-08-01 NOTE — TELEPHONE ENCOUNTER
Reviewed Dr. Maria Isabel Flannery response with pt. Rx escribed to preferred pharmacy. Pt verb understanding.

## 2023-08-01 NOTE — TELEPHONE ENCOUNTER
Message  Received: Today  MD Damon Lau, RN  Caller: Unspecified (Today, 11:17 AM)  Can give him Lasix 40 mg a day to take as needed. Would not take if the edema is not present in the early morning hours. Check basic metabolic panel in 1 week           Attempted to call pt. No answer. Left message to call.

## 2023-08-01 NOTE — TELEPHONE ENCOUNTER
Pt c/o BLE ankle and foot edema for the past 3 weeks. States improves with elevation, but does not go all the way away. Admits to some SEARS when working outside. Admits to adequately hydrating. Taking amlodipine 10 mg daily, eliquis, metoprolol 100 mg bid. Please advise recommendations.

## 2023-08-30 ENCOUNTER — HOSPITAL ENCOUNTER (OUTPATIENT)
Dept: MRI IMAGING | Age: 68
Discharge: HOME | End: 2023-09-02
Attending: ANESTHESIOLOGY
Payer: MEDICARE

## 2023-08-30 DIAGNOSIS — M25.511 RIGHT SHOULDER PAIN, UNSPECIFIED CHRONICITY: ICD-10-CM

## 2023-08-30 PROCEDURE — 73221 MRI JOINT UPR EXTREM W/O DYE: CPT

## 2023-09-08 DIAGNOSIS — I10 ESSENTIAL HYPERTENSION: ICD-10-CM

## 2023-09-12 RX ORDER — METOPROLOL TARTRATE 100 MG/1
100 TABLET ORAL 2 TIMES DAILY
Qty: 180 TABLET | Refills: 3 | Status: SHIPPED | OUTPATIENT
Start: 2023-09-12

## 2023-09-13 ENCOUNTER — TELEPHONE (OUTPATIENT)
Age: 68
End: 2023-09-13

## 2023-09-13 NOTE — TELEPHONE ENCOUNTER
Called and informed pt of Dr. Ruthy Baumann response, okay but would resume 5mg (Eliquis) once he has completed therapy.    Pt v/u

## 2023-09-13 NOTE — TELEPHONE ENCOUNTER
Pt states that he went to the doctor and they said he has Covid. They put him on meds and they want him to take eliquis 2.5 mg instead of 5 mg.  Wants to know if this will be ok

## 2023-11-21 ENCOUNTER — OFFICE VISIT (OUTPATIENT)
Dept: ORTHOPEDIC SURGERY | Age: 68
End: 2023-11-21

## 2023-11-21 VITALS — BODY MASS INDEX: 41.75 KG/M2 | WEIGHT: 315 LBS | HEIGHT: 73 IN

## 2023-11-21 DIAGNOSIS — M19.011 DEGENERATIVE JOINT DISEASE OF RIGHT ACROMIOCLAVICULAR JOINT: ICD-10-CM

## 2023-11-21 DIAGNOSIS — M75.21 BICIPITAL TENDINITIS OF RIGHT SHOULDER: ICD-10-CM

## 2023-11-21 DIAGNOSIS — M75.111 NONTRAUMATIC INCOMPLETE TEAR OF RIGHT ROTATOR CUFF: Primary | ICD-10-CM

## 2023-11-21 DIAGNOSIS — M47.812 CERVICAL SPONDYLOSIS: ICD-10-CM

## 2023-11-21 DIAGNOSIS — S43.431A SUPERIOR GLENOID LABRUM LESION OF RIGHT SHOULDER, INITIAL ENCOUNTER: ICD-10-CM

## 2023-12-06 ENCOUNTER — OFFICE VISIT (OUTPATIENT)
Age: 68
End: 2023-12-06
Payer: MEDICARE

## 2023-12-06 VITALS
HEART RATE: 72 BPM | SYSTOLIC BLOOD PRESSURE: 130 MMHG | WEIGHT: 315 LBS | BODY MASS INDEX: 41.75 KG/M2 | DIASTOLIC BLOOD PRESSURE: 64 MMHG | HEIGHT: 73 IN

## 2023-12-06 DIAGNOSIS — E78.2 MIXED HYPERLIPIDEMIA: ICD-10-CM

## 2023-12-06 DIAGNOSIS — I25.10 CORONARY ARTERY DISEASE INVOLVING NATIVE CORONARY ARTERY OF NATIVE HEART WITHOUT ANGINA PECTORIS: Primary | ICD-10-CM

## 2023-12-06 DIAGNOSIS — Z86.711 HISTORY OF PULMONARY EMBOLISM: ICD-10-CM

## 2023-12-06 DIAGNOSIS — E66.01 OBESITY, MORBID (HCC): ICD-10-CM

## 2023-12-06 DIAGNOSIS — I48.0 PAROXYSMAL ATRIAL FIBRILLATION (HCC): ICD-10-CM

## 2023-12-06 DIAGNOSIS — I10 ESSENTIAL HYPERTENSION: ICD-10-CM

## 2023-12-06 DIAGNOSIS — I82.4Y9 DEEP VEIN THROMBOSIS (DVT) OF PROXIMAL LOWER EXTREMITY, UNSPECIFIED CHRONICITY, UNSPECIFIED LATERALITY (HCC): ICD-10-CM

## 2023-12-06 PROCEDURE — 99214 OFFICE O/P EST MOD 30 MIN: CPT | Performed by: INTERNAL MEDICINE

## 2023-12-06 PROCEDURE — G8427 DOCREV CUR MEDS BY ELIG CLIN: HCPCS | Performed by: INTERNAL MEDICINE

## 2023-12-06 PROCEDURE — G8484 FLU IMMUNIZE NO ADMIN: HCPCS | Performed by: INTERNAL MEDICINE

## 2023-12-06 PROCEDURE — G8417 CALC BMI ABV UP PARAM F/U: HCPCS | Performed by: INTERNAL MEDICINE

## 2023-12-06 PROCEDURE — 3017F COLORECTAL CA SCREEN DOC REV: CPT | Performed by: INTERNAL MEDICINE

## 2023-12-06 PROCEDURE — 1123F ACP DISCUSS/DSCN MKR DOCD: CPT | Performed by: INTERNAL MEDICINE

## 2023-12-06 PROCEDURE — 3075F SYST BP GE 130 - 139MM HG: CPT | Performed by: INTERNAL MEDICINE

## 2023-12-06 PROCEDURE — 3078F DIAST BP <80 MM HG: CPT | Performed by: INTERNAL MEDICINE

## 2023-12-06 PROCEDURE — 1036F TOBACCO NON-USER: CPT | Performed by: INTERNAL MEDICINE

## 2023-12-06 RX ORDER — FUROSEMIDE 40 MG/1
40 TABLET ORAL DAILY PRN
Qty: 90 TABLET | Refills: 3 | Status: SHIPPED | OUTPATIENT
Start: 2023-12-06

## 2023-12-06 RX ORDER — METOPROLOL TARTRATE 100 MG/1
100 TABLET ORAL 2 TIMES DAILY
Qty: 180 TABLET | Refills: 3 | Status: SHIPPED | OUTPATIENT
Start: 2023-12-06

## 2023-12-06 RX ORDER — ATORVASTATIN CALCIUM 40 MG/1
40 TABLET, FILM COATED ORAL DAILY
Qty: 90 TABLET | Refills: 3 | Status: SHIPPED | OUTPATIENT
Start: 2023-12-06

## 2023-12-06 ASSESSMENT — ENCOUNTER SYMPTOMS
SHORTNESS OF BREATH: 0
BACK PAIN: 1

## 2023-12-06 NOTE — PROGRESS NOTES
Priority: Low     1. Venous ultrasound (1/11/11): Nonocclusive thrombus in the midportion   of the right femoral vein with occlusive thrombus in the distal femoral   vein and popliteal vein. Left-sided veins are patent. This appears to be   chronic when compared with study in October. There may be a very small   extension proximally in the mid left femoral vein. Social History     Tobacco Use    Smoking status: Former    Smokeless tobacco: Never    Tobacco comments:     Quit smoking: quit 15-20 years ago   Substance Use Topics    Alcohol use: No       ROS:    Review of Systems   Constitutional: Negative for malaise/fatigue. Cardiovascular:  Positive for dyspnea on exertion. Negative for chest pain. Respiratory:  Negative for shortness of breath. Musculoskeletal:  Positive for arthritis and back pain. Neurological:  Negative for focal weakness and light-headedness. Psychiatric/Behavioral:  Negative for depression. PHYSICAL EXAM:  Wt Readings from Last 3 Encounters:   12/06/23 (!) 149.2 kg (329 lb)   11/21/23 (!) 149.7 kg (330 lb)   08/30/23 (!) 147.4 kg (325 lb)     BP Readings from Last 3 Encounters:   12/06/23 130/64   06/15/23 138/76   11/09/22 130/74     Pulse Readings from Last 3 Encounters:   12/06/23 72   06/15/23 58   11/09/22 64       Physical Exam  Constitutional:       General: He is not in acute distress. Neck:      Vascular: No carotid bruit. Cardiovascular:      Rate and Rhythm: Normal rate and regular rhythm. Pulmonary:      Effort: No respiratory distress. Breath sounds: Normal breath sounds. Abdominal:      General: Bowel sounds are normal.      Palpations: Abdomen is soft. Musculoskeletal:         General: No swelling. Skin:     General: Skin is warm and dry. Neurological:      General: No focal deficit present. Psychiatric:         Mood and Affect: Mood normal.         Medical problems and test results were reviewed with the patient today.

## 2024-01-09 ENCOUNTER — TELEPHONE (OUTPATIENT)
Age: 69
End: 2024-01-09

## 2024-01-09 ENCOUNTER — OFFICE VISIT (OUTPATIENT)
Dept: ORTHOPEDIC SURGERY | Age: 69
End: 2024-01-09
Payer: MEDICARE

## 2024-01-09 DIAGNOSIS — S43.431A SUPERIOR GLENOID LABRUM LESION OF RIGHT SHOULDER, INITIAL ENCOUNTER: ICD-10-CM

## 2024-01-09 DIAGNOSIS — Z86.711 HISTORY OF PULMONARY EMBOLISM: ICD-10-CM

## 2024-01-09 DIAGNOSIS — M47.812 CERVICAL SPONDYLOSIS: ICD-10-CM

## 2024-01-09 DIAGNOSIS — I48.0 PAROXYSMAL ATRIAL FIBRILLATION (HCC): ICD-10-CM

## 2024-01-09 DIAGNOSIS — M19.011 DEGENERATIVE JOINT DISEASE OF RIGHT ACROMIOCLAVICULAR JOINT: ICD-10-CM

## 2024-01-09 DIAGNOSIS — M75.111 NONTRAUMATIC INCOMPLETE TEAR OF RIGHT ROTATOR CUFF: Primary | ICD-10-CM

## 2024-01-09 DIAGNOSIS — M75.21 BICIPITAL TENDINITIS OF RIGHT SHOULDER: ICD-10-CM

## 2024-01-09 PROCEDURE — 1036F TOBACCO NON-USER: CPT | Performed by: ORTHOPAEDIC SURGERY

## 2024-01-09 PROCEDURE — G8484 FLU IMMUNIZE NO ADMIN: HCPCS | Performed by: ORTHOPAEDIC SURGERY

## 2024-01-09 PROCEDURE — G8417 CALC BMI ABV UP PARAM F/U: HCPCS | Performed by: ORTHOPAEDIC SURGERY

## 2024-01-09 PROCEDURE — 99212 OFFICE O/P EST SF 10 MIN: CPT | Performed by: ORTHOPAEDIC SURGERY

## 2024-01-09 PROCEDURE — 3017F COLORECTAL CA SCREEN DOC REV: CPT | Performed by: ORTHOPAEDIC SURGERY

## 2024-01-09 PROCEDURE — 1123F ACP DISCUSS/DSCN MKR DOCD: CPT | Performed by: ORTHOPAEDIC SURGERY

## 2024-01-09 PROCEDURE — G8427 DOCREV CUR MEDS BY ELIG CLIN: HCPCS | Performed by: ORTHOPAEDIC SURGERY

## 2024-01-09 RX ORDER — APIXABAN 5 MG/1
5 TABLET, FILM COATED ORAL 2 TIMES DAILY
Qty: 180 TABLET | Refills: 3 | Status: SHIPPED | OUTPATIENT
Start: 2024-01-09

## 2024-01-09 NOTE — TELEPHONE ENCOUNTER
MEDICATION REFILL REQUEST      Name of Medication:  eliquis   Dose:  5mg  Frequency:  twice a day   Quantity:    Days' supply:  90      Pharmacy Name[facility]Location:  [facility]

## 2024-01-09 NOTE — TELEPHONE ENCOUNTER
Patient called and informed that I received from [employer], that documentation of 3% out of pocket prescription expenses based on household adjusted gross income not met. Patient will call [facility] and see what he needs to submit, will call me back and advise//[email or web address]

## 2024-01-09 NOTE — PROGRESS NOTES
Name: Kyle Esposito  YOB: 1955  Gender: male  MRN: [id number]            HPI: Kyle Esposito is a 68 y.o. right-hand-dominant male seen for right shoulder and neck problems.  He has a history of hypertensive coronary disease on aspirin, history of atrial fibrillation, diabetes mellitus, hypercholesterolemia, a BMI over 43 and a history of PE/DVT on Eliquis for life.  He has longstanding neck issues with pain that radiates down his arm and he is in pain management.  He also has low back issues.  He has developed right shoulder pain.  Initially it was much worse and it was bothering him quite a bit.  It was keeping him up at night.  It was affecting his quality of life.  It is gotten better recently.  His sugars run in the 120s by his report.  He does see a chiropractor for his neck.  He denies any numbness or tingling.  Cortisone injections in the past have elevated his sugars.  He returns and is doing better.    He was  ROS/Meds/PSH/PMH/FH/SH: A ten system review of systems was performed and is negative other than what is in the HPI.   Tobacco:  reports that he has quit smoking. He has never used smokeless tobacco.  There were no vitals taken for this visit.     Physical Examination:  He is an awake alert gentleman ambulating without difficulty  He has a restricted range of cervical spine motion without radicular findings    The left shoulder has 0 to 180 degrees of active and 0 to 180 degrees passive forward elevation.   Internal rotation is to T6.  External rotation is to 60 degrees at the side.   In the 90 degree abducted position 90 degrees of external and 90 degrees internal rotation  The AC joint is non-tender  SC joint is non-tender.   Greater tuberosity is non-tender.  negative biceps  Negative O'Briens sign  negative lift-off sign  Negative belly press sign  Negative bear huggers sign  negative drop sign  negative hornblower's sign  No posterior glenohumeral joint line

## 2024-01-09 NOTE — TELEPHONE ENCOUNTER
Prescription sent to [facility] pharmacy//ting  Requested Prescriptions     Signed Prescriptions Disp Refills    ELIQUIS 5 MG TABS tablet 180 tablet 3     Sig: TAKE ONE TABLET BY MOUTH TWICE A DAY     Authorizing Provider: PETROS LINDO     Ordering User: IGNACIA BELTRAN

## 2024-01-17 ENCOUNTER — TELEPHONE (OUTPATIENT)
Age: 69
End: 2024-01-17

## 2024-01-17 NOTE — TELEPHONE ENCOUNTER
Patient called with DR Graham's response. Patient voiced understanding//brendab  Safe to use.  Also has good cardaic protection      FAMILY HISTORY:  Family history of prostate cancer in father  FH: type 2 diabetes, mother

## 2024-01-17 NOTE — TELEPHONE ENCOUNTER
Patient called stating that his A1C has gone up from 6.5 to 7.2. PCP wants to start him on Farxiga. Patient states that his weight and A1C has gone up due to his back pain and not being able to get out and move around. Patient is concerned that the Farxiga will interfere with his blood pressure and afib medications. Would like to know what Dr. Graham thinks.       Patient informed that Dr. Graham would be sent the above message, call back with doctors response. Patient voiced understanding and thanked me//ting

## 2024-06-17 ENCOUNTER — OFFICE VISIT (OUTPATIENT)
Age: 69
End: 2024-06-17
Payer: MEDICARE

## 2024-06-17 VITALS
HEIGHT: 73 IN | SYSTOLIC BLOOD PRESSURE: 122 MMHG | WEIGHT: 301 LBS | HEART RATE: 64 BPM | DIASTOLIC BLOOD PRESSURE: 60 MMHG | BODY MASS INDEX: 39.89 KG/M2

## 2024-06-17 DIAGNOSIS — I48.0 PAROXYSMAL ATRIAL FIBRILLATION (HCC): Primary | ICD-10-CM

## 2024-06-17 DIAGNOSIS — I10 ESSENTIAL HYPERTENSION: ICD-10-CM

## 2024-06-17 DIAGNOSIS — Z86.718 HISTORY OF DVT (DEEP VEIN THROMBOSIS): ICD-10-CM

## 2024-06-17 DIAGNOSIS — I25.10 CORONARY ARTERY DISEASE INVOLVING NATIVE CORONARY ARTERY OF NATIVE HEART WITHOUT ANGINA PECTORIS: ICD-10-CM

## 2024-06-17 DIAGNOSIS — Z86.711 HISTORY OF PULMONARY EMBOLISM: ICD-10-CM

## 2024-06-17 DIAGNOSIS — E78.2 MIXED HYPERLIPIDEMIA: ICD-10-CM

## 2024-06-17 PROCEDURE — 3017F COLORECTAL CA SCREEN DOC REV: CPT | Performed by: INTERNAL MEDICINE

## 2024-06-17 PROCEDURE — G8427 DOCREV CUR MEDS BY ELIG CLIN: HCPCS | Performed by: INTERNAL MEDICINE

## 2024-06-17 PROCEDURE — G8417 CALC BMI ABV UP PARAM F/U: HCPCS | Performed by: INTERNAL MEDICINE

## 2024-06-17 PROCEDURE — 3074F SYST BP LT 130 MM HG: CPT | Performed by: INTERNAL MEDICINE

## 2024-06-17 PROCEDURE — 1123F ACP DISCUSS/DSCN MKR DOCD: CPT | Performed by: INTERNAL MEDICINE

## 2024-06-17 PROCEDURE — 3078F DIAST BP <80 MM HG: CPT | Performed by: INTERNAL MEDICINE

## 2024-06-17 PROCEDURE — 1036F TOBACCO NON-USER: CPT | Performed by: INTERNAL MEDICINE

## 2024-06-17 PROCEDURE — 99214 OFFICE O/P EST MOD 30 MIN: CPT | Performed by: INTERNAL MEDICINE

## 2024-06-17 PROCEDURE — 93000 ELECTROCARDIOGRAM COMPLETE: CPT | Performed by: INTERNAL MEDICINE

## 2024-06-17 RX ORDER — AMLODIPINE BESYLATE 10 MG/1
10 TABLET ORAL DAILY
Qty: 90 TABLET | Refills: 3 | Status: SHIPPED | OUTPATIENT
Start: 2024-06-17

## 2024-06-17 RX ORDER — ATORVASTATIN CALCIUM 40 MG/1
40 TABLET, FILM COATED ORAL DAILY
Qty: 90 TABLET | Refills: 3 | Status: SHIPPED | OUTPATIENT
Start: 2024-06-17

## 2024-06-17 RX ORDER — METOPROLOL TARTRATE 100 MG/1
100 TABLET ORAL 2 TIMES DAILY
Qty: 180 TABLET | Refills: 3 | Status: SHIPPED | OUTPATIENT
Start: 2024-06-17

## 2024-06-17 RX ORDER — FUROSEMIDE 40 MG/1
40 TABLET ORAL DAILY PRN
Qty: 90 TABLET | Refills: 3 | Status: SHIPPED | OUTPATIENT
Start: 2024-06-17

## 2024-06-17 ASSESSMENT — ENCOUNTER SYMPTOMS: SHORTNESS OF BREATH: 0

## 2024-06-17 NOTE — PROGRESS NOTES
[address], [facility] [address]  [place] [address]  PHONE: [phone]    Kyle Esposito  1955      SUBJECTIVE:   Kyle Esposito is a 68 y.o. male seen for a follow up visit regarding the following:     Chief Complaint   Patient presents with    Atrial Fibrillation       HPI:    Kyle Esposito presents for routine follow up for known Coronary Artery Disease. Multiple issues addressed as outlined below:     Coronary Artery Disease:  Patient denies any recent angina.  Notes compliance with medical therapy.  No recent NTG use.  Notes excess bruising.       Paroxysmal atrial fibrillation: Notes palpitations typically at night.  Improved with deep breaths.  Some dizziness.  Prior vertigo.     History of DVT: On appropriate anticoagulation therapy.  Last hemoglobin was 15.5.  With creatinine 1.0.    Diabetes mellitus: Last hemoglobin A1c on  was 6.0    Hypertension:  Ambulatory BP readings have been controlled.  Patient reports compliance with medical therapy without side effects.      Hyperlipidemia:   Lipid panel on 2024 showed a total cholesterol 114, LDL 47, HDL 35 and triglyceride 198.    Under stress as wife  in April.     Past Medical History, Past Surgical History, Family history, Social History, and Medications were all reviewed with the patient today and updated as necessary.           Current Outpatient Medications:     ELIQUIS 5 MG TABS tablet, TAKE ONE TABLET BY MOUTH TWICE A DAY, Disp: 180 tablet, Rfl: 3    metoprolol (LOPRESSOR) 100 MG tablet, Take 1 tablet by mouth 2 times daily, Disp: 180 tablet, Rfl: 3    furosemide (LASIX) 40 MG tablet, Take 1 tablet by mouth daily as needed (edema), Disp: 90 tablet, Rfl: 3    atorvastatin (LIPITOR) 40 MG tablet, Take 1 tablet by mouth daily, Disp: 90 tablet, Rfl: 3    amLODIPine (NORVASC) 10 MG tablet, Take 1 tablet by mouth daily, Disp: , Rfl:     aspirin 81 MG EC tablet, Take 1 tablet by mouth daily,

## 2024-06-18 ENCOUNTER — TELEPHONE (OUTPATIENT)
Age: 69
End: 2024-06-18

## 2024-06-18 NOTE — TELEPHONE ENCOUNTER
Patient called, states that he has figured out the medication. He states that the furosemide is also Lasix a fluid pill and he only takes it as needed. I confirmed that that was correct. Patient thanked me/ting

## 2024-08-05 ENCOUNTER — HOSPITAL ENCOUNTER (OUTPATIENT)
Dept: GENERAL RADIOLOGY | Age: 69
Discharge: HOME | End: 2024-08-08
Payer: MEDICARE

## 2024-08-05 DIAGNOSIS — R52 PAIN: ICD-10-CM

## 2024-08-05 PROCEDURE — 73502 X-RAY EXAM HIP UNI 2-3 VIEWS: CPT

## 2024-08-12 ENCOUNTER — TELEPHONE (OUTPATIENT)
Age: 69
End: 2024-08-12

## 2024-08-12 NOTE — TELEPHONE ENCOUNTER
Patient called with results, voiced understanding and thanked me//ting  ----- Message from Dr. Je Graham MD sent at 8/12/2024  3:37 PM EDT -----  Please call patient.  Echo shows normal heart function.  Mild calcification of his aortic valve but no significant stenosis or dysfunction.  Overall good result.  Thank you

## 2024-11-11 ENCOUNTER — TELEPHONE (OUTPATIENT)
Age: 69
End: 2024-11-11

## 2024-11-11 NOTE — TELEPHONE ENCOUNTER
Pt called into check phone and lvm to see if pt can come off eliquis to get a shot in the spine,please advise

## 2024-11-11 NOTE — TELEPHONE ENCOUNTER
Patient states that he is to have an injection in his back on 11-19-24. He needs to hold his Eliquis 3 days prior and 1 day afterwards. Patient states that pain management is requesting. This nurse called Pain Management at [phone], left message for Melissa to call me back//ting

## 2024-11-14 ENCOUNTER — TELEPHONE (OUTPATIENT)
Age: 69
End: 2024-11-14

## 2024-11-14 NOTE — TELEPHONE ENCOUNTER
Received faxed request for medication hold from Melissa. Will have DR Graham address Friday when he is back in the office//zuleikaab

## 2024-11-14 NOTE — TELEPHONE ENCOUNTER
Patient called asking if he can hold Eliquis prior to having injection in back for pain. Patient informed that I just received the form this morning, will have Dr. Graham address tomorrow, will call him back and advise. Patient voiced understanding//zuleikaab

## 2024-11-15 NOTE — TELEPHONE ENCOUNTER
Note signed and faxed to [phone]. Patient called, informed he can hold his Eliquis 3 days prior to procedure//brendab

## 2024-12-18 ENCOUNTER — OFFICE VISIT (OUTPATIENT)
Age: 69
End: 2024-12-18
Payer: MEDICARE

## 2024-12-18 VITALS
SYSTOLIC BLOOD PRESSURE: 118 MMHG | BODY MASS INDEX: 40.36 KG/M2 | DIASTOLIC BLOOD PRESSURE: 62 MMHG | HEART RATE: 68 BPM | HEIGHT: 72 IN | WEIGHT: 298 LBS

## 2024-12-18 DIAGNOSIS — I25.10 CORONARY ARTERY DISEASE INVOLVING NATIVE CORONARY ARTERY OF NATIVE HEART WITHOUT ANGINA PECTORIS: Primary | ICD-10-CM

## 2024-12-18 DIAGNOSIS — Z86.711 HISTORY OF PULMONARY EMBOLISM: ICD-10-CM

## 2024-12-18 DIAGNOSIS — I48.0 PAROXYSMAL ATRIAL FIBRILLATION (HCC): ICD-10-CM

## 2024-12-18 DIAGNOSIS — E78.2 MIXED HYPERLIPIDEMIA: ICD-10-CM

## 2024-12-18 DIAGNOSIS — I10 ESSENTIAL HYPERTENSION: ICD-10-CM

## 2024-12-18 PROCEDURE — G8484 FLU IMMUNIZE NO ADMIN: HCPCS | Performed by: INTERNAL MEDICINE

## 2024-12-18 PROCEDURE — 3078F DIAST BP <80 MM HG: CPT | Performed by: INTERNAL MEDICINE

## 2024-12-18 PROCEDURE — G8417 CALC BMI ABV UP PARAM F/U: HCPCS | Performed by: INTERNAL MEDICINE

## 2024-12-18 PROCEDURE — 1123F ACP DISCUSS/DSCN MKR DOCD: CPT | Performed by: INTERNAL MEDICINE

## 2024-12-18 PROCEDURE — G8427 DOCREV CUR MEDS BY ELIG CLIN: HCPCS | Performed by: INTERNAL MEDICINE

## 2024-12-18 PROCEDURE — 1036F TOBACCO NON-USER: CPT | Performed by: INTERNAL MEDICINE

## 2024-12-18 PROCEDURE — 99214 OFFICE O/P EST MOD 30 MIN: CPT | Performed by: INTERNAL MEDICINE

## 2024-12-18 PROCEDURE — 3017F COLORECTAL CA SCREEN DOC REV: CPT | Performed by: INTERNAL MEDICINE

## 2024-12-18 PROCEDURE — 1126F AMNT PAIN NOTED NONE PRSNT: CPT | Performed by: INTERNAL MEDICINE

## 2024-12-18 PROCEDURE — 1159F MED LIST DOCD IN RCRD: CPT | Performed by: INTERNAL MEDICINE

## 2024-12-18 PROCEDURE — 3074F SYST BP LT 130 MM HG: CPT | Performed by: INTERNAL MEDICINE

## 2024-12-18 ASSESSMENT — ENCOUNTER SYMPTOMS
BACK PAIN: 1
SHORTNESS OF BREATH: 0

## 2025-03-31 ENCOUNTER — TELEPHONE (OUTPATIENT)
Age: 70
End: 2025-03-31

## 2025-03-31 DIAGNOSIS — E78.2 MIXED HYPERLIPIDEMIA: ICD-10-CM

## 2025-03-31 RX ORDER — ATORVASTATIN CALCIUM 40 MG/1
40 TABLET, FILM COATED ORAL DAILY
Qty: 90 TABLET | Refills: 3 | Status: SHIPPED | OUTPATIENT
Start: 2025-03-31

## 2025-03-31 NOTE — TELEPHONE ENCOUNTER
Prescription sent to [facility]'s pharmacy//ting  Requested Prescriptions     Signed Prescriptions Disp Refills    atorvastatin (LIPITOR) 40 MG tablet 90 tablet 3     Sig: Take 1 tablet by mouth daily     Authorizing Provider: PETROS LINDO     Ordering User: IGNACIA BELTRAN

## 2025-03-31 NOTE — TELEPHONE ENCOUNTER
MEDICATION REFILL REQUEST      Name of Medication: Atorvastatin  Dose:  40 mg   Frequency:  once daily  Quantity:  30  Days' supply:  90      Pharmacy Name/Location:  [facility]

## 2025-04-11 ENCOUNTER — TELEPHONE (OUTPATIENT)
Age: 70
End: 2025-04-11

## 2025-04-11 DIAGNOSIS — I10 ESSENTIAL HYPERTENSION: Primary | ICD-10-CM

## 2025-04-11 DIAGNOSIS — R60.0 EDEMA, LOWER EXTREMITY: ICD-10-CM

## 2025-04-11 NOTE — TELEPHONE ENCOUNTER
Called patient with Dr Graham's response. Patient voiced understanding, lab Orders entered appointment scheduled for Thursday at 11:00 MA//ting

## 2025-04-11 NOTE — TELEPHONE ENCOUNTER
Patient reports both legs swelling over last 3 days. Also reports some chest tightness, but no SOB.

## 2025-04-11 NOTE — TELEPHONE ENCOUNTER
Patient called stating that his legs are swelling from his knees to his feet. Right is greater than left.  Right leg is really tight, reddish looking around ankle and feet. SOB with exertion, not with normal walking. Shoes are tight and he has to adjust strings so he can wear shoes. Not as swollen when he gets up in the morning, props feet up when sitting. Diet has not changed. Drinks about 42-50 oz of fluid per day.  Has been taking Lasix 40 mg daily for 3 days. One day he took 2 lasix due to the swelling in his legs. Also taking amlodipine 10 mg and metoprolol 100 mg.  Blood pressure runs 120's/60's does not know heart rate.   Patient wanted to know if he should  get an echo prior to next appointment?    Last office visit 12-18-24  Last echo 6-17-24

## 2025-04-11 NOTE — TELEPHONE ENCOUNTER
Attempted to reach patient, left message to call me back//ting    Have him take Lasix 80 mg daily over the weekend.  Check BMP and BNP on Monday.  Have him see me next week.  If needed can put him in at lunch or take a TAVR spot

## 2025-04-14 DIAGNOSIS — I10 ESSENTIAL HYPERTENSION: ICD-10-CM

## 2025-04-14 DIAGNOSIS — R60.0 EDEMA, LOWER EXTREMITY: ICD-10-CM

## 2025-04-14 LAB
ANION GAP SERPL CALC-SCNC: 12 MMOL/L (ref 7–16)
BUN SERPL-MCNC: 14 MG/DL (ref 8–23)
CALCIUM SERPL-MCNC: 9.4 MG/DL (ref 8.8–10.2)
CHLORIDE SERPL-SCNC: 101 MMOL/L (ref 98–107)
CO2 SERPL-SCNC: 27 MMOL/L (ref 20–29)
CREAT SERPL-MCNC: 1.08 MG/DL (ref 0.8–1.3)
GLUCOSE SERPL-MCNC: 156 MG/DL (ref 70–99)
NT PRO BNP: 157 PG/ML (ref 0–125)
POTASSIUM SERPL-SCNC: 4.3 MMOL/L (ref 3.5–5.1)
SODIUM SERPL-SCNC: 140 MMOL/L (ref 136–145)

## 2025-04-17 ENCOUNTER — OFFICE VISIT (OUTPATIENT)
Age: 70
End: 2025-04-17
Payer: MEDICARE

## 2025-04-17 VITALS
HEIGHT: 72 IN | HEART RATE: 62 BPM | DIASTOLIC BLOOD PRESSURE: 70 MMHG | SYSTOLIC BLOOD PRESSURE: 130 MMHG | BODY MASS INDEX: 40.34 KG/M2 | WEIGHT: 297.8 LBS

## 2025-04-17 DIAGNOSIS — I48.0 PAROXYSMAL ATRIAL FIBRILLATION (HCC): ICD-10-CM

## 2025-04-17 DIAGNOSIS — I10 ESSENTIAL HYPERTENSION: ICD-10-CM

## 2025-04-17 DIAGNOSIS — I25.10 CORONARY ARTERY DISEASE INVOLVING NATIVE CORONARY ARTERY OF NATIVE HEART WITHOUT ANGINA PECTORIS: ICD-10-CM

## 2025-04-17 DIAGNOSIS — I87.2 VENOUS INSUFFICIENCY: Primary | ICD-10-CM

## 2025-04-17 DIAGNOSIS — E78.2 MIXED HYPERLIPIDEMIA: ICD-10-CM

## 2025-04-17 DIAGNOSIS — Z86.718 HISTORY OF DVT (DEEP VEIN THROMBOSIS): ICD-10-CM

## 2025-04-17 PROCEDURE — 1159F MED LIST DOCD IN RCRD: CPT | Performed by: INTERNAL MEDICINE

## 2025-04-17 PROCEDURE — G8427 DOCREV CUR MEDS BY ELIG CLIN: HCPCS | Performed by: INTERNAL MEDICINE

## 2025-04-17 PROCEDURE — 3075F SYST BP GE 130 - 139MM HG: CPT | Performed by: INTERNAL MEDICINE

## 2025-04-17 PROCEDURE — 1123F ACP DISCUSS/DSCN MKR DOCD: CPT | Performed by: INTERNAL MEDICINE

## 2025-04-17 PROCEDURE — 3078F DIAST BP <80 MM HG: CPT | Performed by: INTERNAL MEDICINE

## 2025-04-17 PROCEDURE — 99214 OFFICE O/P EST MOD 30 MIN: CPT | Performed by: INTERNAL MEDICINE

## 2025-04-17 PROCEDURE — 1036F TOBACCO NON-USER: CPT | Performed by: INTERNAL MEDICINE

## 2025-04-17 PROCEDURE — 3017F COLORECTAL CA SCREEN DOC REV: CPT | Performed by: INTERNAL MEDICINE

## 2025-04-17 PROCEDURE — G8417 CALC BMI ABV UP PARAM F/U: HCPCS | Performed by: INTERNAL MEDICINE

## 2025-04-17 ASSESSMENT — ENCOUNTER SYMPTOMS
SHORTNESS OF BREATH: 0
BACK PAIN: 1

## 2025-04-17 NOTE — PROGRESS NOTES
[facility], [address], [address]  [place] [address]  PHONE: [phone]    Kyle Esposito  1955      SUBJECTIVE:   Kyle Esposito is a 69 y.o. male seen for a follow up visit regarding the following:     Chief Complaint   Patient presents with    Atrial Fibrillation    Coronary Artery Disease    Hypertension    Other     Leg swelling         HPI:    Kyle Esposito presents for routine follow. Multiple issues addressed as outlined below:     Coronary Artery Disease  Status:  Patient denies any recent angina.  Notes compliance with medical therapy.  No recent NTG use.   Medications:  No ASA with need for chronic anticoagulation.   Prior History:      PCI of LAD (4/5/21):  80% LAD 80% D1. Mild LCx and RCA stenosis.  EF 60-65%. Overlapping Synergy REX. 3.5 x 12 and 2.75 x 38 mm REX. Adjunctive POBA D1 with 2.25 balloon.  IVUS post stent.    Lexiscan Cardiolite (1/20/20):  EF 66%.  Diaphragmatic attenuation.  No ischemia.  (done after low level troponin elevation during afib with RVR with sepsis).  3.  Echo (3/24/21):  EF 60-65%.  Moderate LAE.  Mild ADITYA.  Mild MR/TR.  RVSP 32  Echo (8/9/2024): EF 65-70%.  Moderate LVH.  Mild aortic valve calcification.  MG 10.  PG 16.  Mild LAE.  Trace TR.  RVSP 29.  Normal RV function.       Edema  Status: Patient contacted our office due to worsening edema.  He states this is primarily in his right leg.  This is the leg that he had prior DVT present.  He does have evidence of varicosities on the side.  Symptoms improved with leg elevation.  He does not wear compression stockings.  Has been taking furosemide with limited improvement.  Today edema appears to be very mild.  Medications: Lasix 40 mg daily as needed    Paroxysmal Atrial Fibrillation  Status:  No recent palpitations or tachycardia.    Medications:  Lopressor 100 mg BID.   Prior History:     Noted during admission with Urosepsis at [facility] (11/19)   Echo (11/19):  EF 50-54%.  Grade I DD.

## 2025-05-01 ENCOUNTER — TELEPHONE (OUTPATIENT)
Age: 70
End: 2025-05-01

## 2025-05-01 NOTE — TELEPHONE ENCOUNTER
----- Message from Trice PETERS sent at 4/25/2025  2:28 PM EDT -----  Regarding: [employer]  Patient brought form to be signed to [place] office.  It will be in the basket Next week.  Once Dr. Graham signs it , the patient wants to pick it up in [place].

## 2025-06-03 ENCOUNTER — TELEPHONE (OUTPATIENT)
Age: 70
End: 2025-06-03

## 2025-06-03 NOTE — TELEPHONE ENCOUNTER
Spoke with patient, he states that he picked up paperwork from [employer], but has missed placed the prescription part of the application. Received a letter that he needed to submit this part. Patient informed that Dr Graham is out of the office this week, will have him sign prescription and will fax it over to [employer]. Patient thanked me//ting

## 2025-06-03 NOTE — TELEPHONE ENCOUNTER
Received [employer] PA form for Eliquis again. Left message for patient to call me back as this was completed and sent back to [facility] for patient to . I spoke with Cathy in [place], she states that she cannot find it. Will talk with front office staff to see if patient has picked up,

## 2025-06-10 ENCOUNTER — TELEPHONE (OUTPATIENT)
Age: 70
End: 2025-06-10

## 2025-06-26 ENCOUNTER — OFFICE VISIT (OUTPATIENT)
Age: 70
End: 2025-06-26
Payer: MEDICARE

## 2025-06-26 ENCOUNTER — TELEPHONE (OUTPATIENT)
Age: 70
End: 2025-06-26

## 2025-06-26 VITALS
DIASTOLIC BLOOD PRESSURE: 64 MMHG | SYSTOLIC BLOOD PRESSURE: 120 MMHG | HEIGHT: 72 IN | BODY MASS INDEX: 40.63 KG/M2 | WEIGHT: 300 LBS | HEART RATE: 62 BPM

## 2025-06-26 DIAGNOSIS — E78.2 MIXED HYPERLIPIDEMIA: ICD-10-CM

## 2025-06-26 DIAGNOSIS — I10 ESSENTIAL HYPERTENSION: ICD-10-CM

## 2025-06-26 DIAGNOSIS — I48.0 PAROXYSMAL ATRIAL FIBRILLATION (HCC): ICD-10-CM

## 2025-06-26 DIAGNOSIS — Z86.718 HISTORY OF DVT (DEEP VEIN THROMBOSIS): ICD-10-CM

## 2025-06-26 DIAGNOSIS — I25.10 CORONARY ARTERY DISEASE INVOLVING NATIVE CORONARY ARTERY OF NATIVE HEART WITHOUT ANGINA PECTORIS: Primary | ICD-10-CM

## 2025-06-26 DIAGNOSIS — Z86.711 HISTORY OF PULMONARY EMBOLISM: ICD-10-CM

## 2025-06-26 DIAGNOSIS — I87.2 VENOUS INSUFFICIENCY: ICD-10-CM

## 2025-06-26 PROCEDURE — 1123F ACP DISCUSS/DSCN MKR DOCD: CPT | Performed by: INTERNAL MEDICINE

## 2025-06-26 PROCEDURE — 3017F COLORECTAL CA SCREEN DOC REV: CPT | Performed by: INTERNAL MEDICINE

## 2025-06-26 PROCEDURE — 99214 OFFICE O/P EST MOD 30 MIN: CPT | Performed by: INTERNAL MEDICINE

## 2025-06-26 PROCEDURE — 1036F TOBACCO NON-USER: CPT | Performed by: INTERNAL MEDICINE

## 2025-06-26 PROCEDURE — G8417 CALC BMI ABV UP PARAM F/U: HCPCS | Performed by: INTERNAL MEDICINE

## 2025-06-26 PROCEDURE — 3074F SYST BP LT 130 MM HG: CPT | Performed by: INTERNAL MEDICINE

## 2025-06-26 PROCEDURE — 3078F DIAST BP <80 MM HG: CPT | Performed by: INTERNAL MEDICINE

## 2025-06-26 PROCEDURE — 1126F AMNT PAIN NOTED NONE PRSNT: CPT | Performed by: INTERNAL MEDICINE

## 2025-06-26 PROCEDURE — G8428 CUR MEDS NOT DOCUMENT: HCPCS | Performed by: INTERNAL MEDICINE

## 2025-06-26 RX ORDER — ATORVASTATIN CALCIUM 40 MG/1
40 TABLET, FILM COATED ORAL DAILY
Qty: 90 TABLET | Refills: 3 | OUTPATIENT
Start: 2025-06-26

## 2025-06-26 RX ORDER — METOPROLOL TARTRATE 100 MG/1
100 TABLET ORAL 2 TIMES DAILY
Qty: 180 TABLET | Refills: 3 | OUTPATIENT
Start: 2025-06-26

## 2025-06-26 ASSESSMENT — ENCOUNTER SYMPTOMS
SHORTNESS OF BREATH: 0
BACK PAIN: 1

## 2025-06-26 NOTE — TELEPHONE ENCOUNTER
Prescriptions called into [facility] ID#[id number]  Lily, pharmacist//ting  Requested Prescriptions     Signed Prescriptions Disp Refills    metoprolol (LOPRESSOR) 100 MG tablet 180 tablet 3     Sig: Take 1 tablet by mouth 2 times daily     Authorizing Provider: PETROS LINDO     Ordering User: IGNACIA BELTRAN    apixaban (ELIQUIS) 5 MG TABS tablet 180 tablet 3     Sig: Take 1 tablet by mouth 2 times daily     Authorizing Provider: PETROS LINDO     Ordering User: IGNACIA BELTRAN    atorvastatin (LIPITOR) 40 MG tablet 90 tablet 3     Sig: Take 1 tablet by mouth daily     Authorizing Provider: PETROS LINDO     Ordering User: IGNACIA BELTRAN

## 2025-06-26 NOTE — PROGRESS NOTES
[address], [address] [place] [address]  PHONE: [phone]    Kyle Esposito  1955      SUBJECTIVE:   Kyle Esposito is a 69 y.o. male seen for a follow up visit regarding the following:     Chief Complaint   Patient presents with    Coronary Artery Disease    Atrial Fibrillation       HPI:    Kyle Esposito presents for routine follow. Multiple issues addressed as outlined below:     Coronary Artery Disease  Status:  Patient denies any recent angina.  Notes compliance with medical therapy.  No recent NTG use.   Medications:  No ASA with need for chronic anticoagulation.   Prior History:      PCI of LAD (4/5/21):  80% LAD 80% D1. Mild LCx and RCA stenosis.  EF 60-65%. Overlapping Synergy REX. 3.5 x 12 and 2.75 x 38 mm REX. Adjunctive POBA D1 with 2.25 balloon.  IVUS post stent.    Lexiscan Cardiolite (1/20/20):  EF 66%.  Diaphragmatic attenuation.  No ischemia.  (done after low level troponin elevation during afib with RVR with sepsis).  3.  Echo (3/24/21):  EF 60-65%.  Moderate LAE.  Mild ADITYA.  Mild MR/TR.  RVSP 32  Echo (8/9/2024): EF 65-70%.  Moderate LVH.  Mild aortic valve calcification.  MG 10.  PG 16.  Mild LAE.  Trace TR.  RVSP 29.  Normal RV function.       Edema  Status: Patient contacted our office due to worsening edema in April.  He states this is primarily in his right leg.  This is the leg that he had prior DVT present.  He does have evidence of varicosities on the side.  Symptoms improved with leg elevation.  He does not wear compression stockings.  Has been taking furosemide with limited improvement.  Referred to Dr. Ho at last visit. He was seen and felt prior DVT was etiology of edema.  Given compression sockd  Medications: Lasix 40 mg daily as needed    Paroxysmal Atrial Fibrillation  Status:  No recent palpitations or tachycardia.    Medications:  Lopressor 100 mg BID.  Eliquis 5 mg BID.   Prior History:     Noted during admission with

## 2025-06-26 NOTE — TELEPHONE ENCOUNTER
Pt calling back due to the mail order company given by Dr Graham for the below RX is higher than expected and he wants to discuss w/Nurse    [place]  Lipitor  Lopressor

## 2025-07-08 DIAGNOSIS — I10 ESSENTIAL HYPERTENSION: ICD-10-CM

## 2025-07-08 RX ORDER — METOPROLOL TARTRATE 100 MG/1
100 TABLET ORAL 2 TIMES DAILY
Qty: 60 TABLET | Refills: 0 | Status: SHIPPED | OUTPATIENT
Start: 2025-07-08

## 2025-07-08 NOTE — TELEPHONE ENCOUNTER
Pt is calling asking for samples of Eliquis and Metoprolol   Please call pt to let him know if we have any for him to come

## 2025-08-09 ENCOUNTER — HOSPITAL ENCOUNTER (OUTPATIENT)
Dept: MRI IMAGING | Age: 70
Discharge: HOME | End: 2025-08-12
Attending: ANESTHESIOLOGY
Payer: MEDICARE

## 2025-08-09 DIAGNOSIS — M54.16 LUMBAR RADICULOPATHY: ICD-10-CM

## 2025-08-09 PROCEDURE — 72148 MRI LUMBAR SPINE W/O DYE: CPT
